# Patient Record
Sex: MALE | Race: WHITE | NOT HISPANIC OR LATINO | Employment: OTHER | URBAN - METROPOLITAN AREA
[De-identification: names, ages, dates, MRNs, and addresses within clinical notes are randomized per-mention and may not be internally consistent; named-entity substitution may affect disease eponyms.]

---

## 2017-01-11 RX ORDER — FLUTICASONE PROPIONATE 110 UG/1
2 AEROSOL, METERED RESPIRATORY (INHALATION) 2 TIMES DAILY
Qty: 36 G | Refills: 3 | Status: SHIPPED | OUTPATIENT
Start: 2017-01-11 | End: 2018-02-27 | Stop reason: SDUPTHER

## 2017-01-12 ENCOUNTER — PATIENT MESSAGE (OUTPATIENT)
Dept: PULMONOLOGY | Facility: CLINIC | Age: 72
End: 2017-01-12

## 2017-01-24 ENCOUNTER — PATIENT MESSAGE (OUTPATIENT)
Dept: PULMONOLOGY | Facility: CLINIC | Age: 72
End: 2017-01-24

## 2017-02-08 ENCOUNTER — TELEPHONE (OUTPATIENT)
Dept: GASTROENTEROLOGY | Facility: CLINIC | Age: 72
End: 2017-02-08

## 2017-02-08 NOTE — TELEPHONE ENCOUNTER
Provider Canceled (dr hawthorne will not be in the office , appt cxl, pt can speak with anyone to rescheduled appt

## 2017-02-09 ENCOUNTER — TELEPHONE (OUTPATIENT)
Dept: GASTROENTEROLOGY | Facility: CLINIC | Age: 72
End: 2017-02-09

## 2017-02-09 NOTE — TELEPHONE ENCOUNTER
----- Message from Joselyn Hui MA sent at 2/8/2017  4:23 PM CST -----  Contact: [phone]   Jose Antonio:   Pt appt on 2/14 was cx'ed due to book out, I tried to assist with rescheduling but he wants to see a staff doctor on that same day because he is driving in from out of town and has another appt here that day. Are you able to assist him with getting another doctor to see him that same day? He does not want a mid-level provider. [phone]

## 2017-02-14 ENCOUNTER — OFFICE VISIT (OUTPATIENT)
Dept: PULMONOLOGY | Facility: CLINIC | Age: 72
End: 2017-02-14
Payer: MEDICARE

## 2017-02-14 VITALS
OXYGEN SATURATION: 98 % | HEIGHT: 68 IN | SYSTOLIC BLOOD PRESSURE: 114 MMHG | RESPIRATION RATE: 12 BRPM | HEART RATE: 82 BPM | WEIGHT: 170.19 LBS | BODY MASS INDEX: 25.79 KG/M2 | DIASTOLIC BLOOD PRESSURE: 68 MMHG

## 2017-02-14 DIAGNOSIS — J45.30 CHRONIC ASTHMA, MILD PERSISTENT, UNCOMPLICATED: ICD-10-CM

## 2017-02-14 PROBLEM — J45.909 CHRONIC ASTHMA: Status: ACTIVE | Noted: 2017-02-14

## 2017-02-14 PROCEDURE — 1157F ADVNC CARE PLAN IN RCRD: CPT | Mod: S$GLB,,, | Performed by: INTERNAL MEDICINE

## 2017-02-14 PROCEDURE — 1126F AMNT PAIN NOTED NONE PRSNT: CPT | Mod: S$GLB,,, | Performed by: INTERNAL MEDICINE

## 2017-02-14 PROCEDURE — 99999 PR PBB SHADOW E&M-EST. PATIENT-LVL III: CPT | Mod: PBBFAC,,, | Performed by: INTERNAL MEDICINE

## 2017-02-14 PROCEDURE — 1160F RVW MEDS BY RX/DR IN RCRD: CPT | Mod: S$GLB,,, | Performed by: INTERNAL MEDICINE

## 2017-02-14 PROCEDURE — 3074F SYST BP LT 130 MM HG: CPT | Mod: S$GLB,,, | Performed by: INTERNAL MEDICINE

## 2017-02-14 PROCEDURE — 1159F MED LIST DOCD IN RCRD: CPT | Mod: S$GLB,,, | Performed by: INTERNAL MEDICINE

## 2017-02-14 PROCEDURE — 99203 OFFICE O/P NEW LOW 30 MIN: CPT | Mod: S$GLB,,, | Performed by: INTERNAL MEDICINE

## 2017-02-14 PROCEDURE — 99499 UNLISTED E&M SERVICE: CPT | Mod: S$PBB,,, | Performed by: INTERNAL MEDICINE

## 2017-02-14 PROCEDURE — 3078F DIAST BP <80 MM HG: CPT | Mod: S$GLB,,, | Performed by: INTERNAL MEDICINE

## 2017-02-15 NOTE — PROGRESS NOTES
Subjective:       Patient ID: Raul Lujan is a 71 y.o. male.    Chief Complaint: Asthma    HPI HISTORY OF PRESENT ILLNESS:  Mr. Lujan is a 71-year-old nonsmoker, who comes to   discuss medications for management of chronic asthma.  He had a previous   evaluation here in 2011.  At that time, his methacholine challenge showed   evidence for increased airway reactivity.  He had been having exertional   respiratory problems; and following the institution of treatment with Asmanex,     these symptoms improved substantially or resolved.  Over the   past several years, he has done well with this maintenance medication.  He is   generally able to exercise without respiratory distress.  He is not having any   nighttime respiratory symptoms.  He has not felt the need to use albuterol in   recent months or longer.    Mr. Lujan was recently notified that his pharmacy coverage no longer includes   Asmanex.  As an alternative, I prescribed Flovent 110 HFA inhaler.  He has not   yet begun use of this medication since his existing supply of Asmanex has not    run out.    Mr. Lujan used Zyrtec in the past for control of symptoms related to chronic   rhinitis.  This has not been a recent problem.  He is also not having any ongoing   upper GI symptoms.      CB/HN  dd: 02/14/2017 20:13:21 (CST)  td: 02/15/2017 09:13:26 (CST)  Doc ID   #[id number]  Job ID #[id number]    CC:       Review of Systems   Constitutional: Negative for fever and fatigue.   HENT: Negative for postnasal drip, sinus pressure, voice change and congestion.    Respiratory: Positive for chest tightness and dyspnea on extertion. Negative for cough, sputum production, shortness of breath and wheezing.    Cardiovascular: Negative for chest pain and leg swelling.   Genitourinary: Negative for difficulty urinating.   Musculoskeletal: Negative for arthralgias and back pain.   Skin: Negative for rash.   Gastrointestinal: Negative for abdominal pain and acid reflux.    Neurological: Negative for dizziness and weakness.   Hematological: Negative for adenopathy.       Objective:      Physical Exam   Constitutional: He is oriented to person, place, and time. He appears well-developed and well-nourished.   HENT:   Head: Normocephalic.   Mouth/Throat: Oropharynx is clear and moist. No oropharyngeal exudate.   Neck: Normal range of motion. Neck supple. No JVD present. No tracheal deviation present. No thyromegaly present.   Cardiovascular: Normal rate, regular rhythm and normal heart sounds.    No murmur heard.  Pulmonary/Chest: Normal expansion. No stridor. He has no wheezes. He has no rhonchi. He has no rales. He exhibits no tenderness.   Abdominal: Soft.   Musculoskeletal: He exhibits no edema.   Lymphadenopathy:     He has no cervical adenopathy.   Neurological: He is alert and oriented to person, place, and time.   Skin: Skin is warm and dry. No rash noted. No erythema. Nails show no clubbing.   Psychiatric: He has a normal mood and affect.   Vitals reviewed.    Personal Diagnostic Review    Pulmonary Function Tests 2/14/2017   SpO2 98   Height 68.000   Weight 2723.12   BMI (Calculated) 25.9         Assessment:       1. Chronic asthma, mild persistent, uncomplicated        Outpatient Encounter Prescriptions as of 2/14/2017   Medication Sig Dispense Refill    cetirizine (ZYRTEC) 10 MG tablet Take 10 mg by mouth once daily.      finasteride (PROSCAR) 5 mg tablet TAKE 1 TABLET ONE TIME DAILY 90 tablet 3    fluticasone (FLOVENT HFA) 110 mcg/actuation inhaler Inhale 2 puffs into the lungs 2 (two) times daily. Rinse mouth after each use. 36 g 3    lisinopril-hydrochlorothiazide (PRINZIDE,ZESTORETIC) 10-12.5 mg per tablet Take 1 tablet by mouth 2 (two) times daily. 180 tablet 4    naproxen (NAPROSYN) 500 MG tablet Take 1 tablet (500 mg total) by mouth 2 (two) times daily with meals. 180 tablet 2    pravastatin (PRAVACHOL) 40 MG tablet Take 1 tablet (40 mg total) by mouth once  daily. 1 Tablet Oral Every evening 90 tablet 4    tadalafil (CIALIS) 5 MG tablet TAKE ONE (1) TABLET BY MOUTH DAILY. 90 tablet 3     No facility-administered encounter medications on file as of 2/14/2017.      No orders of the defined types were placed in this encounter.    Plan:     Discussed role for an inhaled corticosteroid as a maintenance therapy for asthma control.  He will try Flovent  1-2 puffs twice daily (Flovent Diskus if he does not like the MDI device).  Return visit 1 year with Spirometry.    NOTE:  36 min visit with majority time counseling regarding asthma management.

## 2017-02-15 NOTE — PATIENT INSTRUCTIONS
Discussed role for an inhaled corticosteroid as a maintenance therapy for asthma control.  He will try Flovent  1-2 puffs twice daily (Flovent Diskus if he does not like the MDI device).  Return visit 1 year with Spirometry.

## 2017-03-15 ENCOUNTER — PATIENT MESSAGE (OUTPATIENT)
Dept: GASTROENTEROLOGY | Facility: CLINIC | Age: 72
End: 2017-03-15

## 2017-03-29 ENCOUNTER — TELEPHONE (OUTPATIENT)
Dept: INTERNAL MEDICINE | Facility: CLINIC | Age: 72
End: 2017-03-29

## 2017-03-29 ENCOUNTER — OFFICE VISIT (OUTPATIENT)
Dept: GASTROENTEROLOGY | Facility: CLINIC | Age: 72
End: 2017-03-29
Payer: MEDICARE

## 2017-03-29 VITALS
SYSTOLIC BLOOD PRESSURE: 135 MMHG | HEART RATE: 65 BPM | DIASTOLIC BLOOD PRESSURE: 86 MMHG | HEIGHT: 69 IN | WEIGHT: 168.44 LBS | BODY MASS INDEX: 24.95 KG/M2

## 2017-03-29 DIAGNOSIS — R10.9 LEFT SIDED ABDOMINAL PAIN: Primary | ICD-10-CM

## 2017-03-29 DIAGNOSIS — K63.5 HYPERPLASTIC POLYP OF ASCENDING COLON: ICD-10-CM

## 2017-03-29 PROCEDURE — 1159F MED LIST DOCD IN RCRD: CPT | Mod: S$GLB,,, | Performed by: INTERNAL MEDICINE

## 2017-03-29 PROCEDURE — 1125F AMNT PAIN NOTED PAIN PRSNT: CPT | Mod: S$GLB,,, | Performed by: INTERNAL MEDICINE

## 2017-03-29 PROCEDURE — 3075F SYST BP GE 130 - 139MM HG: CPT | Mod: S$GLB,,, | Performed by: INTERNAL MEDICINE

## 2017-03-29 PROCEDURE — 99999 PR PBB SHADOW E&M-EST. PATIENT-LVL III: CPT | Mod: PBBFAC,,, | Performed by: INTERNAL MEDICINE

## 2017-03-29 PROCEDURE — 3079F DIAST BP 80-89 MM HG: CPT | Mod: S$GLB,,, | Performed by: INTERNAL MEDICINE

## 2017-03-29 PROCEDURE — 99499 UNLISTED E&M SERVICE: CPT | Mod: S$GLB,,, | Performed by: INTERNAL MEDICINE

## 2017-03-29 PROCEDURE — 1157F ADVNC CARE PLAN IN RCRD: CPT | Mod: S$GLB,,, | Performed by: INTERNAL MEDICINE

## 2017-03-29 PROCEDURE — 99204 OFFICE O/P NEW MOD 45 MIN: CPT | Mod: S$GLB,,, | Performed by: INTERNAL MEDICINE

## 2017-03-29 PROCEDURE — 1160F RVW MEDS BY RX/DR IN RCRD: CPT | Mod: S$GLB,,, | Performed by: INTERNAL MEDICINE

## 2017-03-29 NOTE — MR AVS SNAPSHOT
[facility]  [address]  [place] [address]  Phone: [phone]  Fax: [phone]                  Raul Lujan   3/29/2017 11:00 AM   Office Visit    Description:  Male : 1945   Provider:  Kadeem Melton MD   Department:  [facility]           Reason for Visit     Abdominal Pain                To Do List           Goals (5 Years of Data)     None      [facility]     [facility] On Call Nurse Care Line -  Assistance  Registered nurses in the [facility] On Call Center provide clinical advisement, health education, appointment booking, and other advisory services.  Call for this free service at [phone].             Medications           Message regarding Medications     Verify the changes and/or additions to your medication regime listed below are the same as discussed with your clinician today.  If any of these changes or additions are incorrect, please notify your healthcare provider.             Verify that the below list of medications is an accurate representation of the medications you are currently taking.  If none reported, the list may be blank. If incorrect, please contact your healthcare provider. Carry this list with you in case of emergency.           Current Medications     cetirizine (ZYRTEC) 10 MG tablet Take 10 mg by mouth once daily.    finasteride (PROSCAR) 5 mg tablet TAKE 1 TABLET ONE TIME DAILY    fluticasone (FLOVENT HFA) 110 mcg/actuation inhaler Inhale 2 puffs into the lungs 2 (two) times daily. Rinse mouth after each use.    lisinopril-hydrochlorothiazide (PRINZIDE,ZESTORETIC) 10-12.5 mg per tablet Take 1 tablet by mouth 2 (two) times daily.    naproxen (NAPROSYN) 500 MG tablet Take 1 tablet (500 mg total) by mouth 2 (two) times daily with meals.    pravastatin (PRAVACHOL) 40 MG tablet Take 1 tablet (40 mg total) by mouth once daily. 1 Tablet Oral Every evening    tadalafil (CIALIS) 5 MG tablet TAKE ONE (1) TABLET BY  "MOUTH DAILY.           Clinical Reference Information           Your Vitals Were     BP Pulse Height Weight BMI    135/86 65 5' 9" (1.753 m) 76.4 kg (168 lb 6.9 oz) 24.87 kg/m2      Blood Pressure          Most Recent Value    BP  135/86      Allergies as of 3/29/2017     No Known Allergies      Immunizations Administered on Date of Encounter - 3/29/2017     None      Language Assistance Services     ATTENTION: Language assistance services are available, free of charge. Please call [phone].      ATENCIÓN: Si habla clive, tiene a shultz disposición servicios gratuitos de asistencia lingüística. Llame al [phone].     RAHDA Ý: N?u b?n nói Ti?ng Vi?t, có các d?ch v? h? tr? ngôn ng? mi?n phí dành cho b?n. G?i s? [phone].         [facility] complies with applicable Federal civil rights laws and does not discriminate on the basis of race, color, national origin, age, disability, or sex.        "

## 2017-03-29 NOTE — TELEPHONE ENCOUNTER
----- Message from Klaudia Welch sent at 3/29/2017  1:20 PM CDT -----  Contact: self [phone]  Patient is going back to [place] please call them back find out if dr Herman had scheduled for today, Please call him back and advise

## 2017-03-29 NOTE — LETTER
April 4, 2017      Chas Herman MD  [address]  [facility] [address]           [facility]  [address]  [facility] [address]  Phone: [phone]  Fax: [phone]          Patient: Raul Lujan   MR Number: [id number]   YOB: 1945   Date of Visit: 3/29/2017       Dear Dr. Chas Herman:    Thank you for referring Raul Lujan to me for evaluation. Attached you will find relevant portions of my assessment and plan of care.    If you have questions, please do not hesitate to call me. I look forward to following Raul Lujan along with you.    Sincerely,    Kadeem Melton MD    Enclosure  CC:  No Recipients    If you would like to receive this communication electronically, please contact [email or web address] or [phone] to request more information on [employer] access.    For providers and/or their staff who would like to refer a patient to [facility], please contact us through our one-stop-shop provider referral line, [facility], at [phone].    If you feel you have received this communication in error or would no longer like to receive these types of communications, please e-mail [email or web address]

## 2017-04-03 ENCOUNTER — PATIENT MESSAGE (OUTPATIENT)
Dept: INTERNAL MEDICINE | Facility: CLINIC | Age: 72
End: 2017-04-03

## 2017-04-03 DIAGNOSIS — G89.4 CHRONIC PAIN SYNDROME: Primary | ICD-10-CM

## 2017-04-05 NOTE — PROGRESS NOTES
[facility] Consultation Note    Reason for Consult:    Chief Complaint   Patient presents with    Abdominal Pain       PCP:   Chas Herman   [address] / [place] [address]    Referring MD:  Chas Herman Md  [address] [place] [address]    HPI:  Raul Lujan is a 71 y.o. male here for evaluation of abdominal pain.  Pain in LLQ and is constant for last 2 years.  Radiates from the groin up the left side.  Severity of 3/10.  Progressively worse.  Worse with physical activity and positions.  Stretching (hip extension) helps and leaning forward makes it worse.  Core strengthening exercise also make it worse on left, but not the right.  He has occasional heartburn if he eats late, which usually happens a couple of times per month.  Denies dysphagia.  Denies nausea, vomiting, abdominal cramping, or bloating.  He has normal bowel movements, usually twice daily, and no changes over the last few years.  Denies blood in the stool.  Pin not related to having bowel movements.  Seen by a spine specialist and had MRI done and the pain is not felt to be from the spine.      Endoscopic History:  COLONOSCOPY - 5/13/16 by Dr. Clif Martell; to cecum, no prep comment; 8x14 mm hyperplastic polyp in ascending colon removed with jumbo forceps; sigmoid diverticulosis; internal hemorrhoids; follow-up 5 years.      ROS:  Constitutional: No fevers, chills, No weight loss, normal appetite  ENT: No congestion, rhinorrhea, or chronic sinus problems  CV: No chest pain or palpitations  Pulm: No cough, No shortness of breath  Ophtho: No vision changes or pain  GI: see HPI  Derm: No rash or lesions  Heme: No lymphadenopathy, No bruising  MSK: No arthritis or joint swelling  : No dysuria, No frequent urination  Endo: No hot or cold intolerance      Medical History:  has a past medical history of Asthma, currently active; BPH with urinary obstruction; GERD (gastroesophageal reflux disease);  "Hyperlipidemia; and Hypertension.    Surgical History:  has a past surgical history that includes Appendectomy; Excisional hemorrhoidectomy (october 2012); and Hernia repair.    Family History: family history includes Glaucoma in his father. There is no history of Cancer or Asthma..     Social History:  reports that he has never smoked. He has never used smokeless tobacco. He reports that he does not drink alcohol.    Review of patient's allergies indicates:  No Known Allergies    Prior to Admission medications    Medication Sig Start Date End Date Taking? Authorizing Provider   cetirizine (ZYRTEC) 10 MG tablet Take 10 mg by mouth once daily.   Yes Historical Provider, MD   finasteride (PROSCAR) 5 mg tablet TAKE 1 TABLET ONE TIME DAILY 10/26/16  Yes Chas Herman MD   fluticasone (FLOVENT HFA) 110 mcg/actuation inhaler Inhale 2 puffs into the lungs 2 (two) times daily. Rinse mouth after each use. 1/11/17 1/11/18 Yes RICK Zurita MD   lisinopril-hydrochlorothiazide (PRINZIDE,ZESTORETIC) 10-12.5 mg per tablet Take 1 tablet by mouth 2 (two) times daily. 10/26/16 10/26/17 Yes Chas Herman MD   naproxen (NAPROSYN) 500 MG tablet Take 1 tablet (500 mg total) by mouth 2 (two) times daily with meals. 5/19/16  Yes Khushi Spears PA-C   pravastatin (PRAVACHOL) 40 MG tablet Take 1 tablet (40 mg total) by mouth once daily. 1 Tablet Oral Every evening 10/26/16  Yes Chas Herman MD   tadalafil (CIALIS) 5 MG tablet TAKE ONE (1) TABLET BY MOUTH DAILY. 10/26/16  Yes Chas Herman MD       Objective Findings:  Vital Signs:  /86  Pulse 65  Ht 5' 9" (1.753 m)  Wt 76.4 kg (168 lb 6.9 oz)  BMI 24.87 kg/m2  Body mass index is 24.87 kg/(m^2).    Physical Exam:  General Appearance:  Well appearing in no acute distress, appears stated age  Head:  Normocephalic, atraumatic  Eyes:  No scleral icterus or pallor, EOMI  ENT:  Neck supple, moist mucosa; OP clear  Lungs:  CTA bilaterally in anterior and " posterior fields, no wheezes, no crackles; no dullness  Heart:  Regular rate and rhythm, S1, S2 normal, no murmurs heard  Abdomen:  Soft, non distended with positive bowel sounds in all four quadrants. No hepatosplenomegaly, ascites, or mass; pain along the left costal margin tracking along to the back; no rashes; carnett's equivocal  Extremities:  No clubbing, cyanosis, or edema        Labs:  Lab Results   Component Value Date    WBC 5.13 10/26/2016    HGB 15.1 10/26/2016    HCT 44.4 10/26/2016    MCV 89 10/26/2016     10/26/2016     Lab Results   Component Value Date     10/26/2016    K 4.0 10/26/2016     10/26/2016    CO2 29 10/26/2016     10/26/2016    BUN 13 10/26/2016    CREATININE 0.9 10/26/2016    CALCIUM 9.6 10/26/2016    PROT 6.5 10/26/2016    ALBUMIN 4.0 10/26/2016    BILITOT 0.7 10/26/2016    ALKPHOS 43 (L) 10/26/2016    AST 24 10/26/2016    ALT 24 10/26/2016         Imaging:  CT abdomen/pelvis 3/9/15 normal        Assessment:  Raul Lujan is a 71 y.o. male with LLQ abdominal pain that radiates up the left side and also has tenderness of the LUQ costal margin.  CT scan unremarkable.  Colonoscopy by outside physician unremarkable.  Not felt to be due to a spinal source.  His description and physical examination point more towards a neuropathic or musculoskeletal origin.  No GI complaints.  Overall, the pain does not appear to be GI related.  Colonoscopy in 2016 with large hyperplastic ascending colon polyp with 5-yr recommended follow-up.       Recommendations/Plan:  No further GI evaluation necessary as would likely be very low yield.  Recommend further evaluation for a non-GI related source, possibly neuropathic or musculoskeletal.  Will defer further eval to his PCP.    Follow-up as needed in clinic.        Thank you so much for allowing me to participate in the care of Raul Lujan    Kadeem Melton MD

## 2017-04-11 ENCOUNTER — PATIENT MESSAGE (OUTPATIENT)
Dept: INTERNAL MEDICINE | Facility: CLINIC | Age: 72
End: 2017-04-11

## 2017-05-17 ENCOUNTER — PATIENT MESSAGE (OUTPATIENT)
Dept: INTERNAL MEDICINE | Facility: CLINIC | Age: 72
End: 2017-05-17

## 2017-07-23 ENCOUNTER — PATIENT MESSAGE (OUTPATIENT)
Dept: INTERNAL MEDICINE | Facility: CLINIC | Age: 72
End: 2017-07-23

## 2017-07-26 ENCOUNTER — OFFICE VISIT (OUTPATIENT)
Dept: INTERNAL MEDICINE | Facility: CLINIC | Age: 72
End: 2017-07-26
Payer: MEDICARE

## 2017-07-26 VITALS
BODY MASS INDEX: 24.29 KG/M2 | WEIGHT: 164 LBS | SYSTOLIC BLOOD PRESSURE: 124 MMHG | DIASTOLIC BLOOD PRESSURE: 82 MMHG | HEART RATE: 70 BPM | OXYGEN SATURATION: 98 % | HEIGHT: 69 IN

## 2017-07-26 DIAGNOSIS — M70.41 PREPATELLAR BURSITIS OF RIGHT KNEE: ICD-10-CM

## 2017-07-26 DIAGNOSIS — M47.819 SPONDYLOSIS WITHOUT MYELOPATHY: ICD-10-CM

## 2017-07-26 DIAGNOSIS — M25.561 ACUTE PAIN OF RIGHT KNEE: Primary | ICD-10-CM

## 2017-07-26 PROCEDURE — 99213 OFFICE O/P EST LOW 20 MIN: CPT | Mod: S$GLB,,, | Performed by: INTERNAL MEDICINE

## 2017-07-26 PROCEDURE — 99999 PR PBB SHADOW E&M-EST. PATIENT-LVL IV: CPT | Mod: PBBFAC,,, | Performed by: INTERNAL MEDICINE

## 2017-07-26 PROCEDURE — 1159F MED LIST DOCD IN RCRD: CPT | Mod: S$GLB,,, | Performed by: INTERNAL MEDICINE

## 2017-07-26 PROCEDURE — 1125F AMNT PAIN NOTED PAIN PRSNT: CPT | Mod: S$GLB,,, | Performed by: INTERNAL MEDICINE

## 2017-07-26 NOTE — PROGRESS NOTES
Subjective:       Patient ID: Raul Lujan is a 72 y.o. male.    Chief Complaint: Knee Pain (right )    History of present illness: Patient complains of right knee swelling after working on his knees on his deck for a prolonged period of time.  He tried ice which helped and the swelling is a little less.  There is not much pain.  He still has more work to do and does not intend to get off his knees yet.  He is using a pad but did not use any anti-inflammatory or an Ace wrap.  No skin breakdown or drainage.  The swelling did improve a bit but is still there.      Knee Pain        Review of Systems   Constitutional: Negative for activity change and unexpected weight change.   HENT: Negative for hearing loss, rhinorrhea and trouble swallowing.    Eyes: Negative for discharge and visual disturbance.   Respiratory: Negative for chest tightness and wheezing.    Cardiovascular: Negative for chest pain and palpitations.   Gastrointestinal: Negative for blood in stool, constipation, diarrhea and vomiting.   Endocrine: Negative for polydipsia and polyuria.   Genitourinary: Negative for difficulty urinating, hematuria and urgency.   Musculoskeletal: Positive for arthralgias and joint swelling. Negative for neck pain.   Neurological: Negative for weakness and headaches.   Psychiatric/Behavioral: Negative for confusion and dysphoric mood.       Objective:      Physical Exam   Constitutional: He appears well-developed and well-nourished.   HENT:   Head: Normocephalic and atraumatic.   Musculoskeletal: He exhibits tenderness (mild tenderness with flexion and extension of the right knee.) and deformity (mild fluid collection at the prepatellar bursa). He exhibits no edema.   Skin: No rash noted.   No skin breakdown       Assessment:       1. Acute pain of right knee    2. Prepatellar bursitis of right knee    3. Spondylosis without myelopathy        Plan:       Raul was seen today for knee pain.    Diagnoses and all orders for  this visit:    Acute pain of right knee  -     Ambulatory referral to Orthopedics    Prepatellar bursitis of right knee  Comments:  Ice, ace wrap, avoid kneeling. Consider Ortho.   Orders:  -     Ambulatory referral to Orthopedics    Spondylosis without myelopathy        Call with any problems

## 2017-08-13 ENCOUNTER — PATIENT MESSAGE (OUTPATIENT)
Dept: INTERNAL MEDICINE | Facility: CLINIC | Age: 72
End: 2017-08-13

## 2017-10-06 ENCOUNTER — PATIENT MESSAGE (OUTPATIENT)
Dept: PULMONOLOGY | Facility: CLINIC | Age: 72
End: 2017-10-06

## 2017-10-06 ENCOUNTER — PATIENT MESSAGE (OUTPATIENT)
Dept: INTERNAL MEDICINE | Facility: CLINIC | Age: 72
End: 2017-10-06

## 2017-10-06 ENCOUNTER — TELEPHONE (OUTPATIENT)
Dept: INTERNAL MEDICINE | Facility: CLINIC | Age: 72
End: 2017-10-06

## 2017-10-06 DIAGNOSIS — R21 SKIN RASH: ICD-10-CM

## 2017-10-06 DIAGNOSIS — R10.9 ABDOMINAL PAIN, UNSPECIFIED ABDOMINAL LOCATION: Primary | ICD-10-CM

## 2017-10-09 ENCOUNTER — PATIENT MESSAGE (OUTPATIENT)
Dept: INTERNAL MEDICINE | Facility: CLINIC | Age: 72
End: 2017-10-09

## 2017-10-09 NOTE — TELEPHONE ENCOUNTER
Libertad, however we can help him make these appointments. If he is still having abdominal pain then I think seeing Gastro again is important especially since he is worried about Diverticulitis. Maybe one of the checkout staff can help him with coordinating these.

## 2017-10-24 ENCOUNTER — OFFICE VISIT (OUTPATIENT)
Dept: INTERNAL MEDICINE | Facility: CLINIC | Age: 72
End: 2017-10-24
Payer: MEDICARE

## 2017-10-24 ENCOUNTER — LAB VISIT (OUTPATIENT)
Dept: LAB | Facility: HOSPITAL | Age: 72
End: 2017-10-24
Attending: INTERNAL MEDICINE
Payer: MEDICARE

## 2017-10-24 VITALS
HEART RATE: 67 BPM | WEIGHT: 167.56 LBS | SYSTOLIC BLOOD PRESSURE: 124 MMHG | BODY MASS INDEX: 24.74 KG/M2 | DIASTOLIC BLOOD PRESSURE: 80 MMHG | OXYGEN SATURATION: 97 %

## 2017-10-24 DIAGNOSIS — J45.909 CHRONIC ASTHMA WITHOUT COMPLICATION, UNSPECIFIED ASTHMA SEVERITY, UNSPECIFIED WHETHER PERSISTENT: Primary | ICD-10-CM

## 2017-10-24 DIAGNOSIS — R21 RASH OF BACK: ICD-10-CM

## 2017-10-24 DIAGNOSIS — R10.9 LEFT SIDED ABDOMINAL PAIN: ICD-10-CM

## 2017-10-24 DIAGNOSIS — E78.5 HYPERLIPIDEMIA, UNSPECIFIED HYPERLIPIDEMIA TYPE: ICD-10-CM

## 2017-10-24 DIAGNOSIS — S80.812A ABRASION OF LEFT LOWER EXTREMITY, INITIAL ENCOUNTER: ICD-10-CM

## 2017-10-24 DIAGNOSIS — Z12.5 SCREENING FOR PROSTATE CANCER: ICD-10-CM

## 2017-10-24 DIAGNOSIS — N13.8 BENIGN PROSTATIC HYPERPLASIA WITH URINARY OBSTRUCTION: ICD-10-CM

## 2017-10-24 DIAGNOSIS — I10 ESSENTIAL HYPERTENSION: ICD-10-CM

## 2017-10-24 DIAGNOSIS — N40.1 BENIGN PROSTATIC HYPERPLASIA WITH URINARY OBSTRUCTION: ICD-10-CM

## 2017-10-24 LAB
ALBUMIN SERPL BCP-MCNC: 3.8 G/DL
ALP SERPL-CCNC: 50 U/L
ALT SERPL W/O P-5'-P-CCNC: 23 U/L
ANION GAP SERPL CALC-SCNC: 7 MMOL/L
AST SERPL-CCNC: 24 U/L
BASOPHILS # BLD AUTO: 0.06 K/UL
BASOPHILS NFR BLD: 0.9 %
BILIRUB SERPL-MCNC: 0.5 MG/DL
BUN SERPL-MCNC: 18 MG/DL
CALCIUM SERPL-MCNC: 9.6 MG/DL
CHLORIDE SERPL-SCNC: 104 MMOL/L
CO2 SERPL-SCNC: 32 MMOL/L
COMPLEXED PSA SERPL-MCNC: 2.2 NG/ML
CREAT SERPL-MCNC: 0.9 MG/DL
CRP SERPL-MCNC: 1.3 MG/L
DIFFERENTIAL METHOD: NORMAL
EOSINOPHIL # BLD AUTO: 0.1 K/UL
EOSINOPHIL NFR BLD: 2 %
ERYTHROCYTE [DISTWIDTH] IN BLOOD BY AUTOMATED COUNT: 13.1 %
ERYTHROCYTE [SEDIMENTATION RATE] IN BLOOD BY WESTERGREN METHOD: 1 MM/HR
EST. GFR  (AFRICAN AMERICAN): >60 ML/MIN/1.73 M^2
EST. GFR  (NON AFRICAN AMERICAN): >60 ML/MIN/1.73 M^2
GLUCOSE SERPL-MCNC: 82 MG/DL
HCT VFR BLD AUTO: 43.2 %
HGB BLD-MCNC: 14.1 G/DL
LYMPHOCYTES # BLD AUTO: 2.1 K/UL
LYMPHOCYTES NFR BLD: 32.2 %
MCH RBC QN AUTO: 29.6 PG
MCHC RBC AUTO-ENTMCNC: 32.6 G/DL
MCV RBC AUTO: 91 FL
MONOCYTES # BLD AUTO: 0.5 K/UL
MONOCYTES NFR BLD: 7.4 %
NEUTROPHILS # BLD AUTO: 3.8 K/UL
NEUTROPHILS NFR BLD: 57.2 %
NRBC BLD-RTO: 0 /100 WBC
PLATELET # BLD AUTO: 198 K/UL
PMV BLD AUTO: 11.1 FL
POTASSIUM SERPL-SCNC: 3.9 MMOL/L
PROT SERPL-MCNC: 6.5 G/DL
RBC # BLD AUTO: 4.77 M/UL
SODIUM SERPL-SCNC: 143 MMOL/L
WBC # BLD AUTO: 6.62 K/UL

## 2017-10-24 PROCEDURE — 85025 COMPLETE CBC W/AUTO DIFF WBC: CPT

## 2017-10-24 PROCEDURE — 99499 UNLISTED E&M SERVICE: CPT | Mod: S$GLB,,, | Performed by: INTERNAL MEDICINE

## 2017-10-24 PROCEDURE — 36415 COLL VENOUS BLD VENIPUNCTURE: CPT

## 2017-10-24 PROCEDURE — 85651 RBC SED RATE NONAUTOMATED: CPT

## 2017-10-24 PROCEDURE — 84153 ASSAY OF PSA TOTAL: CPT

## 2017-10-24 PROCEDURE — 86140 C-REACTIVE PROTEIN: CPT

## 2017-10-24 PROCEDURE — 99999 PR PBB SHADOW E&M-EST. PATIENT-LVL IV: CPT | Mod: PBBFAC,,, | Performed by: INTERNAL MEDICINE

## 2017-10-24 PROCEDURE — 80053 COMPREHEN METABOLIC PANEL: CPT

## 2017-10-24 PROCEDURE — 99214 OFFICE O/P EST MOD 30 MIN: CPT | Mod: S$GLB,,, | Performed by: INTERNAL MEDICINE

## 2017-10-24 RX ORDER — SILDENAFIL CITRATE 20 MG/1
60 TABLET ORAL 3 TIMES DAILY
Qty: 270 TABLET | Refills: 4 | Status: SHIPPED | OUTPATIENT
Start: 2017-10-24 | End: 2018-06-29 | Stop reason: SDUPTHER

## 2017-10-24 NOTE — PROGRESS NOTES
Subjective:       Patient ID: Raul Lujan is a 72 y.o. male.    Chief Complaint: Abdominal Pain    History of present illness: 72-year-old male here to follow-up on issues.  He is only temporarily in town and informs me he's moving to [place] to be near his family.  He wanted to see pulmonary for follow-up of asthma.  He is continued with some chronic left-sided abdominal pain.  He brings in an outside scope that showed diverticulosis but no evidence of diverticulitis at that time.  The back and spine folks previously thought this may have been a radiculopathy type pain but he remains concerned about his abdomen.  He is seeing gastroenterology tomorrow.  He has a healing abrasion on the left shin.  It is much improved from previous.  He is also having slight rash on the back of his neck.  He thought it was from sunburn but there is some slight scaling in it may relate to a superficial fungal infection.  Lastly he would like me to present a generic sildenafil 20 mg prescription to take 3 daily.  He says it is to be used for BPH.  He certainly has a history of that although has had some ED as well.  I will provide the prescription.  I explained he still may require an authorization but we will try to assist.  He understands not to take this with nitroglycerin products.      Abdominal Pain   This is a chronic problem. The current episode started more than 1 year ago. The onset quality is gradual. The problem occurs constantly. The problem has been gradually worsening. The pain is located in the LLQ. The pain is at a severity of 3/10. The pain is mild. The quality of the pain is cramping. The abdominal pain does not radiate. Associated symptoms include myalgias. Pertinent negatives include no anorexia, arthralgias, belching, constipation, diarrhea, dysuria, fever, flatus, frequency, headaches, hematochezia, hematuria, melena, nausea, vomiting or weight loss. Nothing aggravates the pain. The pain is relieved by nothing.  He has tried nothing for the symptoms. Prior diagnostic workup includes CT scan, GI consult and lower endoscopy. His past medical history is significant for GERD. There is no history of abdominal surgery, colon cancer, Crohn's disease, gallstones, irritable bowel syndrome, pancreatitis, PUD or ulcerative colitis. Patient's medical history does not include kidney stones and UTI.     Review of Systems   Constitutional: Negative for chills, fatigue, fever, unexpected weight change and weight loss.   HENT: Negative for trouble swallowing.    Eyes: Negative for visual disturbance.   Respiratory: Negative for cough, shortness of breath and wheezing.    Cardiovascular: Negative for chest pain and palpitations.   Gastrointestinal: Positive for abdominal pain. Negative for anorexia, constipation, diarrhea, flatus, hematochezia, melena, nausea and vomiting.   Genitourinary: Negative for difficulty urinating, dysuria, frequency and hematuria.   Musculoskeletal: Positive for myalgias. Negative for arthralgias and neck pain.   Skin: Positive for rash and wound.   Neurological: Negative for dizziness and headaches.       Objective:      Physical Exam   Constitutional: He is oriented to person, place, and time. He appears well-developed and well-nourished. No distress.   HENT:   Head: Normocephalic and atraumatic.   Mouth/Throat: No oropharyngeal exudate.   TM's clear, pharynx clear   Eyes: Conjunctivae and EOM are normal. Pupils are equal, round, and reactive to light. No scleral icterus.   Neck: Normal range of motion. Neck supple. No thyromegaly present.   No supraclavicular nodes palpated   Cardiovascular: Normal rate, regular rhythm and normal heart sounds.    No murmur heard.  Pulmonary/Chest: Effort normal and breath sounds normal. He has no wheezes.   Abdominal: Soft. Bowel sounds are normal. He exhibits no mass. There is tenderness (left middle abdomen with no masses or hernias noted).   Musculoskeletal: He exhibits no  edema.   Lymphadenopathy:     He has no cervical adenopathy.   Neurological: He is alert and oriented to person, place, and time.   Skin: Rash noted. No pallor.   Small very well-healed abrasion left shin.  No erythema.  Very slight mildly hyperpigmented rash with some scaling or peeling on the back of the neck.  May be a mild fungal reaction   Psychiatric: He has a normal mood and affect.       Assessment:       1. Chronic asthma without complication, unspecified asthma severity, unspecified whether persistent    2. Essential hypertension    3. Hyperlipidemia, unspecified hyperlipidemia type    4. Rash of back    5. Abrasion of left lower extremity, initial encounter    6. Left sided abdominal pain    7. Screening for prostate cancer    8. Benign prostatic hyperplasia with urinary obstruction        Plan:       Raul was seen today for abdominal pain.    Diagnoses and all orders for this visit:    Chronic asthma without complication, unspecified asthma severity, unspecified whether persistent  -     Ambulatory referral to Pulmonology    Essential hypertension    Hyperlipidemia, unspecified hyperlipidemia type    Rash of back    Abrasion of left lower extremity, initial encounter    Left sided abdominal pain  -     CBC auto differential; Future  -     Comprehensive metabolic panel; Future  -     PSA, Screening; Future  -     Sedimentation rate, manual; Future  -     C-reactive protein; Future    Screening for prostate cancer  -     PSA, Screening; Future    Benign prostatic hyperplasia with urinary obstruction  -     sildenafil (REVATIO) 20 mg Tab; Take 3 tablets (60 mg total) by mouth 3 (three) times daily.        Follow-up after above

## 2017-10-25 ENCOUNTER — TELEPHONE (OUTPATIENT)
Dept: ENDOSCOPY | Facility: HOSPITAL | Age: 72
End: 2017-10-25

## 2017-10-25 ENCOUNTER — OFFICE VISIT (OUTPATIENT)
Dept: GASTROENTEROLOGY | Facility: CLINIC | Age: 72
End: 2017-10-25
Payer: MEDICARE

## 2017-10-25 ENCOUNTER — LAB VISIT (OUTPATIENT)
Dept: LAB | Facility: HOSPITAL | Age: 72
End: 2017-10-25
Payer: MEDICARE

## 2017-10-25 ENCOUNTER — PATIENT MESSAGE (OUTPATIENT)
Dept: INTERNAL MEDICINE | Facility: CLINIC | Age: 72
End: 2017-10-25

## 2017-10-25 VITALS
DIASTOLIC BLOOD PRESSURE: 92 MMHG | WEIGHT: 166.88 LBS | HEIGHT: 68 IN | HEART RATE: 65 BPM | SYSTOLIC BLOOD PRESSURE: 153 MMHG | BODY MASS INDEX: 25.29 KG/M2

## 2017-10-25 DIAGNOSIS — R10.32 CHRONIC LLQ PAIN: Primary | ICD-10-CM

## 2017-10-25 DIAGNOSIS — R10.32 CHRONIC LLQ PAIN: ICD-10-CM

## 2017-10-25 DIAGNOSIS — G89.29 CHRONIC LLQ PAIN: ICD-10-CM

## 2017-10-25 DIAGNOSIS — K21.9 GASTROESOPHAGEAL REFLUX DISEASE, ESOPHAGITIS PRESENCE NOT SPECIFIED: ICD-10-CM

## 2017-10-25 DIAGNOSIS — G89.29 CHRONIC LLQ PAIN: Primary | ICD-10-CM

## 2017-10-25 PROCEDURE — 86677 HELICOBACTER PYLORI ANTIBODY: CPT

## 2017-10-25 PROCEDURE — 99214 OFFICE O/P EST MOD 30 MIN: CPT | Mod: S$GLB,,, | Performed by: NURSE PRACTITIONER

## 2017-10-25 PROCEDURE — 99499 UNLISTED E&M SERVICE: CPT | Mod: S$GLB,,, | Performed by: NURSE PRACTITIONER

## 2017-10-25 PROCEDURE — 99999 PR PBB SHADOW E&M-EST. PATIENT-LVL V: CPT | Mod: PBBFAC,,, | Performed by: NURSE PRACTITIONER

## 2017-10-25 PROCEDURE — 36415 COLL VENOUS BLD VENIPUNCTURE: CPT

## 2017-10-25 NOTE — PROGRESS NOTES
[facility] Note    Reason for Visit:  The primary encounter diagnosis was Chronic LLQ pain. A diagnosis of Gastroesophageal reflux disease, esophagitis presence not specified was also pertinent to this visit.    PCP:   Chas Herman   [address] / [place] [address]    Referring MD:  Chas Herman Md  [address] [place] [address]    HPI:  This is a 72 y.o. male here for follow up evaluation of LLQ pain. He was last worked up with Dr. Melton in March for abdominal pain. He is new to me.     LLQ pain for a little over 2 years. Occurring daily and constantly present. Feels continuous ache. If presses in middle of abdomen will feel twinge to LLQ. Worse with core strengthening exercises. Severity was a 3/10 in March and now reports a 4/10. Pt reports not disabling pain capable of completed ADLs. Denies nausea, vomiting, and fever. Seen by a spine specialist and had MRI done and the pain is not felt to be from the spine. Abd CT 3/2015 No acute intra-abdominal findings to correlate with patient's clinical history of abdominal pain. Last visit in March appeared neuropathic or musculoskeletal.     Having 2 normal formed stool daily. No blood in stool and black tarry stool. 5/2016 Colonoscopy done in [place]- ascending colon polyp removed bx hyperplastic polyp. Repeat in 5 years.     Hx of Gerd. Pt reports EGD done 5-10 years ago and detected signs of reflux. Will lie down elevated at night. No PPI use. Will have frequent regurgitation. Denies dysphagia/odynophagia. NSAID usage- none.     ROS:  Constitutional: No fevers, no chills, No unintentional weight loss, no fatigue   ENT: No allergies  CV: No chest pain, no palpitations, no perif. edema  Pulm: No cough, No shortness of breath, no wheezes, no sputum  Ophtho: No vision changes  GI: see HPI; also no nausea, no vomiting, no change in appetite  Derm: No rash  Heme: No lymphadenopathy, No bruising  MSK: No arthritis, no  "muscle pain, no muscle weakness  : No dysuria, No hematuria  Endo: No hot or cold intolerance  Neuro: No syncope, No seizure     Medical History:  has a past medical history of Asthma, currently active; BPH with urinary obstruction; GERD (gastroesophageal reflux disease); Hyperlipidemia; and Hypertension.    Surgical History:  has a past surgical history that includes Appendectomy; Excisional hemorrhoidectomy (october 2012); and Hernia repair.    Family History: family history includes Glaucoma in his father..     Social History:  reports that he has never smoked. He has never used smokeless tobacco. He reports that he does not drink alcohol.    Review of patient's allergies indicates:  No Known Allergies    Current Outpatient Prescriptions   Medication Sig    cetirizine (ZYRTEC) 10 MG tablet Take 10 mg by mouth once daily.    finasteride (PROSCAR) 5 mg tablet TAKE 1 TABLET ONE TIME DAILY    fluticasone (FLOVENT HFA) 110 mcg/actuation inhaler Inhale 2 puffs into the lungs 2 (two) times daily. Rinse mouth after each use.    lisinopril-hydrochlorothiazide (PRINZIDE,ZESTORETIC) 10-12.5 mg per tablet Take 1 tablet by mouth 2 (two) times daily.    naproxen (NAPROSYN) 500 MG tablet Take 1 tablet (500 mg total) by mouth 2 (two) times daily with meals.    pravastatin (PRAVACHOL) 40 MG tablet Take 1 tablet (40 mg total) by mouth once daily. 1 Tablet Oral Every evening    sildenafil (REVATIO) 20 mg Tab Take 3 tablets (60 mg total) by mouth 3 (three) times daily.     No current facility-administered medications for this visit.      Objective Findings:    Vital Signs:  BP (!) 153/92   Pulse 65   Ht 5' 8" (1.727 m)   Wt 75.7 kg (166 lb 14.2 oz)   BMI 25.38 kg/m²   Body mass index is 25.38 kg/m².    Physical Exam:  General Appearance: Well appearing in no acute distress  Head: Normocephalic, without obvious abnormality  Eyes: No scleral icterus, EOMI  ENT: Neck supple, Lips, mucosa, and tongue normal; teeth and gums " normal  Lungs: CTA bilaterally in anterior and posterior fields, no wheezes, no crackles.  Heart: Regular rate and rhythm, no murmurs heard  Abdomen: Soft, tenderness present upon palpation to LLQ, no rebound tenderness, non distended with positive bowel sounds in all four quadrants.  Extremities: No clubbing, cyanosis or edema  Skin: No rash to exposed areas  Neurologic: AAOx4    Labs:  Lab Results   Component Value Date    WBC 6.62 10/24/2017    HGB 14.1 10/24/2017    HCT 43.2 10/24/2017     10/24/2017    CHOL 212 (H) 10/26/2016    TRIG 65 10/26/2016    HDL 68 10/26/2016    ALT 23 10/24/2017    AST 24 10/24/2017     10/24/2017    K 3.9 10/24/2017     10/24/2017    CREATININE 0.9 10/24/2017    BUN 18 10/24/2017    CO2 32 (H) 10/24/2017    TSH 1.353 09/05/2013    PSA 2.2 10/24/2017    HGBA1C 5.6 09/22/2014     Endoscopy:    Colonoscopy- 5/13/16 by Dr. Clif Martell; to cecum, no prep comment; 8x14 mm hyperplastic polyp in ascending colon removed with jumbo forceps; sigmoid diverticulosis; internal hemorrhoids; follow-up 5 years.    Assessment:  1. Chronic LLQ pain    2. Gastroesophageal reflux disease, esophagitis presence not specified      Recommendations:  1. Reviewed case with Dr. Brady. Will proceed with abdominal MRI to evaluate chronic abdominal pain. Possible musculoskeletal pain.   2. Schedule EGD to rule out esophagitis and ulcer. Order H. Pylori antibody.     Patient leaving for [place] and will be back in [facility] 11/16/17. Will have MRI done at that time and follow up pending MRI results. Pt understood.     Order summary:  Orders Placed This Encounter    MRI Abdomen W WO Contrast    H. PYLORI ANTIBODY, IGG     Thank you so much for allowing me to participate in the care of RaulANUEL Barba, FNP-C

## 2017-10-25 NOTE — LETTER
October 26, 2017      Chas Herman MD  [address]  [facility] [address]           [facility]  [address]  [facility] [address]  Phone: [phone]  Fax: [phone]          Patient: Raul Lujan   MR Number: [id number]   YOB: 1945   Date of Visit: 10/25/2017       Dear Dr. Chas Herman:    Thank you for referring Raul Lujan to me for evaluation. Attached you will find relevant portions of my assessment and plan of care.    If you have questions, please do not hesitate to call me. I look forward to following Raul Lujan along with you.    Sincerely,    Michelle Mora, ALLYSON    Enclosure  CC:  No Recipients    If you would like to receive this communication electronically, please contact [email or web address] or [phone] to request more information on [employer] Link access.    For providers and/or their staff who would like to refer a patient to [facility], please contact us through our one-stop-shop provider referral line, [facility], at [phone].    If you feel you have received this communication in error or would no longer like to receive these types of communications, please e-mail [email or web address]

## 2017-10-25 NOTE — TELEPHONE ENCOUNTER
Patient in office to schedule EGD.  Before attempting to schedule-he informed me that he doesn't want to schedule the procedure.  He told me that he has no one to be here with him and stay for procedure. He decided that he will have it done in [place].  He will follow up with Michelle after that time.  Order canceled.

## 2017-10-26 ENCOUNTER — TELEPHONE (OUTPATIENT)
Dept: PULMONOLOGY | Facility: CLINIC | Age: 72
End: 2017-10-26

## 2017-10-26 ENCOUNTER — PATIENT MESSAGE (OUTPATIENT)
Dept: INTERNAL MEDICINE | Facility: CLINIC | Age: 72
End: 2017-10-26

## 2017-10-26 ENCOUNTER — PATIENT MESSAGE (OUTPATIENT)
Dept: GASTROENTEROLOGY | Facility: CLINIC | Age: 72
End: 2017-10-26

## 2017-10-26 LAB — H PYLORI IGG SERPL QL IA: NEGATIVE

## 2017-10-26 NOTE — TELEPHONE ENCOUNTER
Pt reports recent hoarseness.  He is unsure whether this is related to GERD, or an adverse effect from Flovent.  He previously used Asmanex and did not have any throat symptoms.  He will begin trial with a spacer, and report results.

## 2017-10-30 ENCOUNTER — PATIENT MESSAGE (OUTPATIENT)
Dept: INTERNAL MEDICINE | Facility: CLINIC | Age: 72
End: 2017-10-30

## 2017-10-30 ENCOUNTER — PATIENT MESSAGE (OUTPATIENT)
Dept: GASTROENTEROLOGY | Facility: CLINIC | Age: 72
End: 2017-10-30

## 2017-11-15 ENCOUNTER — OFFICE VISIT (OUTPATIENT)
Dept: PULMONOLOGY | Facility: CLINIC | Age: 72
End: 2017-11-15
Payer: MEDICARE

## 2017-11-15 ENCOUNTER — OFFICE VISIT (OUTPATIENT)
Dept: INTERNAL MEDICINE | Facility: CLINIC | Age: 72
End: 2017-11-15
Payer: MEDICARE

## 2017-11-15 ENCOUNTER — HOSPITAL ENCOUNTER (OUTPATIENT)
Dept: PULMONOLOGY | Facility: CLINIC | Age: 72
Discharge: HOME | End: 2017-11-15
Payer: MEDICARE

## 2017-11-15 ENCOUNTER — HOSPITAL ENCOUNTER (OUTPATIENT)
Dept: RADIOLOGY | Facility: HOSPITAL | Age: 72
Discharge: HOME | End: 2017-11-15
Attending: NURSE PRACTITIONER
Payer: MEDICARE

## 2017-11-15 VITALS
BODY MASS INDEX: 25.58 KG/M2 | RESPIRATION RATE: 12 BRPM | DIASTOLIC BLOOD PRESSURE: 72 MMHG | HEIGHT: 67 IN | OXYGEN SATURATION: 97 % | SYSTOLIC BLOOD PRESSURE: 124 MMHG | HEART RATE: 77 BPM | WEIGHT: 163 LBS

## 2017-11-15 VITALS
SYSTOLIC BLOOD PRESSURE: 122 MMHG | BODY MASS INDEX: 25.79 KG/M2 | WEIGHT: 164.69 LBS | DIASTOLIC BLOOD PRESSURE: 72 MMHG | HEART RATE: 88 BPM | OXYGEN SATURATION: 97 %

## 2017-11-15 DIAGNOSIS — M54.5 CHRONIC LEFT-SIDED LOW BACK PAIN, WITH SCIATICA PRESENCE UNSPECIFIED: ICD-10-CM

## 2017-11-15 DIAGNOSIS — J45.909 CHRONIC ASTHMA: Primary | ICD-10-CM

## 2017-11-15 DIAGNOSIS — J45.30 CHRONIC ASTHMA, MILD PERSISTENT, UNCOMPLICATED: ICD-10-CM

## 2017-11-15 DIAGNOSIS — N13.8 BPH WITH URINARY OBSTRUCTION: ICD-10-CM

## 2017-11-15 DIAGNOSIS — J45.909 CHRONIC ASTHMA WITHOUT COMPLICATION, UNSPECIFIED ASTHMA SEVERITY, UNSPECIFIED WHETHER PERSISTENT: Primary | ICD-10-CM

## 2017-11-15 DIAGNOSIS — J31.0 CHRONIC RHINITIS, UNSPECIFIED TYPE: ICD-10-CM

## 2017-11-15 DIAGNOSIS — E78.5 HYPERLIPIDEMIA, UNSPECIFIED HYPERLIPIDEMIA TYPE: ICD-10-CM

## 2017-11-15 DIAGNOSIS — I10 ESSENTIAL HYPERTENSION: Primary | ICD-10-CM

## 2017-11-15 DIAGNOSIS — N40.1 BPH WITH URINARY OBSTRUCTION: ICD-10-CM

## 2017-11-15 DIAGNOSIS — R10.32 CHRONIC LLQ PAIN: ICD-10-CM

## 2017-11-15 DIAGNOSIS — K21.9 GASTROESOPHAGEAL REFLUX DISEASE, ESOPHAGITIS PRESENCE NOT SPECIFIED: ICD-10-CM

## 2017-11-15 DIAGNOSIS — G89.29 CHRONIC LLQ PAIN: ICD-10-CM

## 2017-11-15 DIAGNOSIS — I10 ESSENTIAL HYPERTENSION: ICD-10-CM

## 2017-11-15 DIAGNOSIS — G89.29 CHRONIC LEFT-SIDED LOW BACK PAIN, WITH SCIATICA PRESENCE UNSPECIFIED: ICD-10-CM

## 2017-11-15 DIAGNOSIS — J45.909 CHRONIC ASTHMA WITHOUT COMPLICATION, UNSPECIFIED ASTHMA SEVERITY, UNSPECIFIED WHETHER PERSISTENT: ICD-10-CM

## 2017-11-15 LAB
PRE FEV1 FVC: 78
PRE FEV1: 3.05
PRE FVC: 3.92
PREDICTED FEV1 FVC: 79
PREDICTED FEV1: 2.74
PREDICTED FVC: 3.45

## 2017-11-15 PROCEDURE — 74183 MRI ABD W/O CNTR FLWD CNTR: CPT | Mod: 26,,, | Performed by: RADIOLOGY

## 2017-11-15 PROCEDURE — 99213 OFFICE O/P EST LOW 20 MIN: CPT | Mod: S$GLB,,, | Performed by: INTERNAL MEDICINE

## 2017-11-15 PROCEDURE — 99999 PR PBB SHADOW E&M-EST. PATIENT-LVL III: CPT | Mod: PBBFAC,,, | Performed by: INTERNAL MEDICINE

## 2017-11-15 PROCEDURE — 74183 MRI ABD W/O CNTR FLWD CNTR: CPT | Mod: TC

## 2017-11-15 PROCEDURE — A9585 GADOBUTROL INJECTION: HCPCS | Performed by: NURSE PRACTITIONER

## 2017-11-15 PROCEDURE — 25500020 PHARM REV CODE 255: Performed by: NURSE PRACTITIONER

## 2017-11-15 PROCEDURE — 99214 OFFICE O/P EST MOD 30 MIN: CPT | Mod: 25,S$GLB,, | Performed by: INTERNAL MEDICINE

## 2017-11-15 PROCEDURE — 94010 BREATHING CAPACITY TEST: CPT | Mod: S$GLB,,, | Performed by: INTERNAL MEDICINE

## 2017-11-15 RX ORDER — GADOBUTROL 604.72 MG/ML
10 INJECTION INTRAVENOUS
Status: COMPLETED | OUTPATIENT
Start: 2017-11-15 | End: 2017-11-15

## 2017-11-15 RX ADMIN — GADOBUTROL 10 ML: 604.72 INJECTION INTRAVENOUS at 02:11

## 2017-11-15 NOTE — LETTER
November 15, 2017      Chas Herman MD  [address]  [facility] [address]           [facility]  [address]  [facility] [address]  Phone: [phone]          Patient: Raul Lujan   MR Number: [id number]   YOB: 1945   Date of Visit: 11/15/2017       Dear Dr. Chas Herman:    Thank you for referring Raul Lujan to me for evaluation. Attached you will find relevant portions of my assessment and plan of care.    If you have questions, please do not hesitate to call me. I look forward to following Raul Lujan along with you.    Sincerely,    RICK Zurita MD    Enclosure  CC:  No Recipients    If you would like to receive this communication electronically, please contact [email or web address] or [phone] to request more information on [employer] Link access.    For providers and/or their staff who would like to refer a patient to [facility], please contact us through our one-stop-shop provider referral line, [facility], at [phone].    If you feel you have received this communication in error or would no longer like to receive these types of communications, please e-mail [email or web address]

## 2017-11-15 NOTE — PROGRESS NOTES
Subjective:       Patient ID: Raul Lujan is a 72 y.o. male.    Chief Complaint: Follow-up    Patient here to follow up abdominal pain and back pain and asthma.  He is probably moving to [place] permanently but wanted to follow-up.  His mildly itchy skin is improved so we will not see a dermatologist down here.  He is finishing up with Gastroenterology and has seen Pulmonary.  He feels things are stable as [place].  He will monitor his blood pressure.      Review of Systems   Constitutional: Negative for chills, fatigue, fever and unexpected weight change.   HENT: Negative for nosebleeds and trouble swallowing.    Eyes: Negative for pain and visual disturbance.   Respiratory: Negative for cough, shortness of breath and wheezing.    Cardiovascular: Negative for chest pain and palpitations.   Gastrointestinal: Positive for abdominal pain (  Chronic mildLow lateral abdominal pain seeing gastro). Negative for constipation, diarrhea, nausea and vomiting.   Genitourinary: Negative for difficulty urinating and hematuria.   Musculoskeletal: Positive for back pain. Negative for neck pain.   Skin: Negative for rash.   Neurological: Negative for dizziness and headaches.   Hematological: Does not bruise/bleed easily.   Psychiatric/Behavioral: Negative for dysphoric mood, sleep disturbance and suicidal ideas.       Objective:      Physical Exam   Constitutional: He appears well-developed and well-nourished.   HENT:   Head: Normocephalic and atraumatic.   Cardiovascular: Regular rhythm.    Pulmonary/Chest: Effort normal and breath sounds normal.   Abdominal: There is tenderness.   Musculoskeletal: He exhibits tenderness.       Assessment:       1. Essential hypertension    2. Hyperlipidemia, unspecified hyperlipidemia type    3. Chronic asthma without complication, unspecified asthma severity, unspecified whether persistent    4. BPH with urinary obstruction    5. Chronic left-sided low back pain,  with sciatica presence unspecified    6. Gastroesophageal reflux disease, esophagitis presence not specified        Plan:       Raul was seen today for follow-up.    Diagnoses and all orders for this visit:    Essential hypertension    Hyperlipidemia, unspecified hyperlipidemia type    Chronic asthma without complication, unspecified asthma severity, unspecified whether persistent    BPH with urinary obstruction    Chronic left-sided low back pain, with sciatica presence unspecified    Gastroesophageal reflux disease, esophagitis presence not specified

## 2017-11-16 ENCOUNTER — TELEPHONE (OUTPATIENT)
Dept: GASTROENTEROLOGY | Facility: CLINIC | Age: 72
End: 2017-11-16

## 2017-11-16 NOTE — PATIENT INSTRUCTIONS
Begin trial with Flovent 110 HFA with spacer (he may try reduced dose after return to [place] permanently).  Complete GI evaluation, and restart Omeprazole if needed.  Call/return if needed.

## 2017-11-16 NOTE — PROGRESS NOTES
Subjective:       Patient ID: Raul Lujan is a 72 y.o. male.    Chief Complaint: Asthma    HPI HISTORY OF PRESENT ILLNESS:  Mr. Lujan is a 72-year-old nonsmoker, who returns   for an interval assessment of asthma.  He has not had any major airway problems   during the past several months.  He has had mild hoarseness or irritation in his   throat.  He had called several weeks ago to report this.  We had discussed the   plan for him to begin using Flovent inhaler with a spacer.  He has been   unable to begin this trial.  He actually resumed using Asmanex, which is the   maintenance medication he used prior to beginning Flovent, since he never   any throat symptoms while on that medication.    Mr. Lujan anticipates moving back to [place] in the next few months.  He   expects the respiratory symptoms will be less severe than they have been during   the time he has lived in [place].    Mr. Lujan remains on antihistamines for control of chronic rhinitis.  He also   continues to take medications for control of hypertension.    DATA:  I have reviewed the results of a spirometry study done earlier today.    The forced vital capacity is 3.92 L, which is 114% of the expected value.  The   FEV1 is 3.05 L, 112% of predicted.  The ratio of these values is 78%.  When today's study results are compared to a previous study from 11/2011, there   does not appear to have been any detrimental change.  Current values remain well   within the range of normal.      CB/HN  dd: 11/15/2017 20:10:18 (CST)  td: 11/16/2017 12:47:10 (CST)  Doc ID   #[id number]  Job ID #[id number]    CC:       Review of Systems   Constitutional: Negative for fever and fatigue.   HENT: Positive for voice change. Negative for postnasal drip, sinus pressure and congestion.    Respiratory: Positive for cough. Negative for sputum production, shortness of breath, wheezing and dyspnea on extertion.    Cardiovascular: Negative for chest pain and leg  swelling.   Genitourinary: Negative for difficulty urinating.   Musculoskeletal: Negative for arthralgias and back pain.   Skin: Negative for rash.   Gastrointestinal: Positive for acid reflux. Negative for abdominal pain.   Neurological: Negative for dizziness and weakness.   Hematological: Negative for adenopathy.       Objective:      Physical Exam   Constitutional: He is oriented to person, place, and time. He appears well-developed and well-nourished.   HENT:   Head: Normocephalic.   Mouth/Throat: Oropharynx is clear and moist. No oropharyngeal exudate.   Neck: Normal range of motion. Neck supple. No JVD present. No tracheal deviation present. No thyromegaly present.   Cardiovascular: Normal rate, regular rhythm and normal heart sounds.    No murmur heard.  Pulmonary/Chest: Normal expansion. No stridor. He has no wheezes. He has no rhonchi. He has no rales. He exhibits no tenderness.   Abdominal: Soft.   Musculoskeletal: He exhibits no edema.   Lymphadenopathy:     He has no cervical adenopathy.   Neurological: He is alert and oriented to person, place, and time.   Skin: Skin is warm and dry. No rash noted. No erythema. Nails show no clubbing.   Psychiatric: He has a normal mood and affect.   Vitals reviewed.    Personal Diagnostic Review    No flowsheet data found.      Assessment:       1. Chronic asthma without complication, unspecified asthma severity, unspecified whether persistent    2. Essential hypertension    3. Chronic rhinitis, unspecified type        Outpatient Encounter Prescriptions as of 11/15/2017   Medication Sig Dispense Refill    cetirizine (ZYRTEC) 10 MG tablet Take 10 mg by mouth once daily.      finasteride (PROSCAR) 5 mg tablet TAKE 1 TABLET ONE TIME DAILY 90 tablet 3    fluticasone (FLOVENT HFA) 110 mcg/actuation inhaler Inhale 2 puffs into the lungs 2 (two) times daily. Rinse mouth after each use. 36 g 3    lisinopril-hydrochlorothiazide (PRINZIDE,ZESTORETIC) 10-12.5 mg per tablet  Take 1 tablet by mouth 2 (two) times daily. 180 tablet 4    naproxen (NAPROSYN) 500 MG tablet Take 1 tablet (500 mg total) by mouth 2 (two) times daily with meals. 180 tablet 2    pravastatin (PRAVACHOL) 40 MG tablet Take 1 tablet (40 mg total) by mouth once daily. 1 Tablet Oral Every evening 90 tablet 4    sildenafil (REVATIO) 20 mg Tab Take 3 tablets (60 mg total) by mouth 3 (three) times daily. 270 tablet 4     Facility-Administered Encounter Medications as of 11/15/2017   Medication Dose Route Frequency Provider Last Rate Last Dose    [COMPLETED] gadobutrol 10 mL  10 mL Intravenous ONCE PRN Michelle Mora NP   10 mL at 11/15/17 1418     No orders of the defined types were placed in this encounter.    Plan:     Begin trial with Flovent 110 HFA with spacer (he may try reduced dose after return to [place] permanently).  Complete GI evaluation, and restart Omeprazole if needed.  Call/return if needed.

## 2017-11-16 NOTE — TELEPHONE ENCOUNTER
----- Message from Michelle Mora NP sent at 11/16/2017 12:08 PM CST -----  MRI abdomen unremarkable.     Michelle

## 2017-11-28 ENCOUNTER — PATIENT MESSAGE (OUTPATIENT)
Dept: INTERNAL MEDICINE | Facility: CLINIC | Age: 72
End: 2017-11-28

## 2018-01-10 ENCOUNTER — PATIENT MESSAGE (OUTPATIENT)
Dept: GASTROENTEROLOGY | Facility: CLINIC | Age: 73
End: 2018-01-10

## 2018-01-11 ENCOUNTER — PATIENT MESSAGE (OUTPATIENT)
Dept: GASTROENTEROLOGY | Facility: CLINIC | Age: 73
End: 2018-01-11

## 2018-01-14 ENCOUNTER — PATIENT MESSAGE (OUTPATIENT)
Dept: INTERNAL MEDICINE | Facility: CLINIC | Age: 73
End: 2018-01-14

## 2018-01-14 ENCOUNTER — PATIENT MESSAGE (OUTPATIENT)
Dept: GASTROENTEROLOGY | Facility: CLINIC | Age: 73
End: 2018-01-14

## 2018-01-16 ENCOUNTER — TELEPHONE (OUTPATIENT)
Dept: GASTROENTEROLOGY | Facility: CLINIC | Age: 73
End: 2018-01-16

## 2018-01-16 DIAGNOSIS — K21.9 GASTROESOPHAGEAL REFLUX DISEASE, ESOPHAGITIS PRESENCE NOT SPECIFIED: Primary | ICD-10-CM

## 2018-01-16 DIAGNOSIS — G89.29 CHRONIC LLQ PAIN: ICD-10-CM

## 2018-01-16 DIAGNOSIS — R10.32 CHRONIC LLQ PAIN: ICD-10-CM

## 2018-01-16 NOTE — TELEPHONE ENCOUNTER
----- Message from Michelle Mora NP sent at 1/16/2018  9:06 AM CST -----  Can you please notify Mr. Lujan ordered placed for EGD and can you give him schedulers number to schedule EGD. Thanks.     Michelle

## 2018-02-19 ENCOUNTER — TELEPHONE (OUTPATIENT)
Dept: INTERNAL MEDICINE | Facility: CLINIC | Age: 73
End: 2018-02-19

## 2018-02-19 NOTE — TELEPHONE ENCOUNTER
----- Message from Gabrielle Glass sent at 2/19/2018 10:50 AM CST -----  Contact: self [phone]  Patient requesting a call from the office in regards to appointment he  Have on 02/27/18. Please advise, Thanks

## 2018-02-19 NOTE — TELEPHONE ENCOUNTER
Pt has an annual exam scheduled for 2/27/18 at 10:30 and his wife has an appointment scheduled on the same day at 11:15. Can I move the wife to the 11 slot so they can be seen together?

## 2018-02-26 ENCOUNTER — TELEPHONE (OUTPATIENT)
Dept: OPTOMETRY | Facility: CLINIC | Age: 73
End: 2018-02-26

## 2018-02-26 NOTE — TELEPHONE ENCOUNTER
Member Details      Member Name: SANDRA WOODS  Member ID: [id number]  Social Security Number: [id number]  YOB: 1945  Address: [address] [place] [address] [address] [address]  Phone Number: [phone]  Gender: Male  Responsible Member: SANDRA WOODS    Network: [id number]  Group: [employer] Exam [employer] ([id number])  Benefit Level: 775  Service Eligibility  If you have more than one location under your User ID, choose a location then use the drop-down menu to choose the provider who is rendering services.     From the tabs below, select the type of benefit you will be providing, then check the box(es) next to the applicable service(s). You will not receive an authorization for this member. Instead, simply click Submit Claim to start the claim process.    Routine refers to routine vision benefits, including eye exams, lenses, frames and contact lenses.   Medical refers to benefits for medical eye care services, including diabetic eye care.   Additional Purchases will calculate member payments on additional pairs of glasses and other materials members receive discounts on so you can determine member out-of-pocket costs.  To learn more, download our Member Benefits Display Job Aid.          Location [address] [facility], [address] ([id number])  Provider   Date of Service: 02/27/2018  Routine   is Eligible? Member Eligible As Of*   Exam Yes 01/01/2017   Lenses Yes 01/01/2017   Frames Yes 01/01/2017   Contact Lenses Yes 01/01/2017  Submit Claim  You will not get an authorization for this member; simply click Submit Claim to start the claim process.         Where did my authorization button go? Click here    Where is the reimbursement button? Click here    Click here to learn more about migrated members.          This information is based on data available in our system today and does not take into account future changes to the member's plan. Please verify eligibility immediately prior  to receiving services.    Service Restrictions  Plan allows member to receive Exam only services with discount on materials. Frame, Lens, and lens options must be purchased in the same transaction to receive the full discount. If purchased separately, members receive 20% off retail price  Related Members  Below are other members covered under the same subscriber ID. Select the member name to start a claim.    Member Name Group Name Member ID NICHELLE# SANDRA DAY Medicare Exam Select Doctor [id number] [phone]  Showing 1 family member  Member Benefits  Select the link below to view benefit details for this member. (Your browser must be java script-enabled to use this function.)    Note: you will not receive an authorization for this member. To complete the claim later, select Claims from the navigation window.     Hide Benefits    Routine In Network  Exam Services   Exam with Dilation as Necessary $0 Copay  Frames   Frame 40% off Retail Price  Lenses   Single Vision $50  Bifocal $70  Trifocal $105  Lenticular Single Vision 20% off Retail Price  Progressive - Standard $135  Progressive - Premium 20% off Retail Price  Lens Options   Anti Reflective Coating - Standard $45  Anti Reflective Coating - Premium 20% off Retail Price  Polycarbonate - Standard $40  Scratch Coating - Standard Plastic $15  Tint - Solid or Gradient $15  UV Treatment $15  All Other Lens Options 20% off Retail Price  Contact Lenses   Contacts - Conventional 15% off Retail Price  Contacts - Disposable 100% of Retail Price  Benefits are not provided for services or materials arising from:    Orthoptic or vision training, subnormal vision aids and any associated supplemental testing  Aniseikonic lenses  Medical and/or surgical treatment of the eye, eyes or supporting structures  Any eye or vision examination, or any corrective eyewear required by a policyholder as a condition of employment  Safety eyewear  Services provided as a  result of any Workers' Compensation law, or similar legislation, or required by any governmental agency or program whether federal, state or subdivisions thereof  S[place]ney (non-prescription) lenses and/or contact lenses  Non-prescription sunglasses  Two pair of glasses in lieu of bifocals  Services or materials provided by any other group benefit plan providing vision care  Services rendered after the date an insured person ceases to be covered under the policy, except when vision materials ordered before coverage ended are delivered, and the services rendered to the Insured person are within 31 days from the date of such order  Replacement of lost or broken lenses, frames, glasses or contact lenses except in the next benefit frequency when vision materials would next become available  Discounts on certain brand name vision materials in which the  imposes a no-discount practice  Benefits may not be combined with any discount, promotional offering or other group benefit plans. Benefit allowances provide no remaining balance for future use within the same benefit frequency.    Patient Financial Responsibility  [facility] recommends you print this page and have the patient sign and date the below acknowledgement. The document can be kept in the patient's file.    I hereby authorize this vision care provider to apply for benefits on my behalf for covered services rendered by them. I also assign my benefits and request that all payments from [facility] made directly to the vision care provider. I agree to assume responsibility for full payment pending any remaining balance that is not covered by [employer].    I certify that the information I have reported with regard to my coverage is correct. I further authorize vision care provider to release to [facility] and its agents any information related to this or any related claim.

## 2018-02-27 ENCOUNTER — LAB VISIT (OUTPATIENT)
Dept: LAB | Facility: HOSPITAL | Age: 73
End: 2018-02-27
Attending: INTERNAL MEDICINE
Payer: MEDICARE

## 2018-02-27 ENCOUNTER — OFFICE VISIT (OUTPATIENT)
Dept: INTERNAL MEDICINE | Facility: CLINIC | Age: 73
End: 2018-02-27
Payer: MEDICARE

## 2018-02-27 ENCOUNTER — OFFICE VISIT (OUTPATIENT)
Dept: OPTOMETRY | Facility: CLINIC | Age: 73
End: 2018-02-27
Payer: COMMERCIAL

## 2018-02-27 VITALS
HEIGHT: 69 IN | OXYGEN SATURATION: 98 % | SYSTOLIC BLOOD PRESSURE: 118 MMHG | DIASTOLIC BLOOD PRESSURE: 76 MMHG | WEIGHT: 163.81 LBS | HEART RATE: 66 BPM | BODY MASS INDEX: 24.26 KG/M2

## 2018-02-27 DIAGNOSIS — H04.123 DRY EYE SYNDROME, BILATERAL: ICD-10-CM

## 2018-02-27 DIAGNOSIS — H52.4 PRESBYOPIA: Primary | ICD-10-CM

## 2018-02-27 DIAGNOSIS — M51.37 DEGENERATION OF LUMBAR OR LUMBOSACRAL INTERVERTEBRAL DISC: ICD-10-CM

## 2018-02-27 DIAGNOSIS — E78.5 HYPERLIPIDEMIA, UNSPECIFIED HYPERLIPIDEMIA TYPE: ICD-10-CM

## 2018-02-27 DIAGNOSIS — D31.32 CHOROIDAL NEVUS, LEFT EYE: ICD-10-CM

## 2018-02-27 DIAGNOSIS — J45.30 CHRONIC ASTHMA, MILD PERSISTENT, UNCOMPLICATED: ICD-10-CM

## 2018-02-27 DIAGNOSIS — H25.13 NS (NUCLEAR SCLEROSIS), BILATERAL: ICD-10-CM

## 2018-02-27 DIAGNOSIS — N13.8 BPH WITH URINARY OBSTRUCTION: ICD-10-CM

## 2018-02-27 DIAGNOSIS — K21.9 GASTROESOPHAGEAL REFLUX DISEASE, ESOPHAGITIS PRESENCE NOT SPECIFIED: ICD-10-CM

## 2018-02-27 DIAGNOSIS — M43.6 NECK STIFFNESS: ICD-10-CM

## 2018-02-27 DIAGNOSIS — N40.1 BPH WITH URINARY OBSTRUCTION: ICD-10-CM

## 2018-02-27 DIAGNOSIS — I10 ESSENTIAL HYPERTENSION: ICD-10-CM

## 2018-02-27 DIAGNOSIS — Z23 NEED FOR PROPHYLACTIC VACCINATION WITH STREPTOCOCCUS PNEUMONIAE (PNEUMOCOCCUS) AND INFLUENZA VACCINES: ICD-10-CM

## 2018-02-27 DIAGNOSIS — M47.819 SPONDYLOSIS WITHOUT MYELOPATHY: ICD-10-CM

## 2018-02-27 DIAGNOSIS — Z00.00 ROUTINE PHYSICAL EXAMINATION: Primary | ICD-10-CM

## 2018-02-27 LAB
CHOLEST SERPL-MCNC: 191 MG/DL
CHOLEST/HDLC SERPL: 2.9 {RATIO}
HDLC SERPL-MCNC: 65 MG/DL
HDLC SERPL: 34 %
LDLC SERPL CALC-MCNC: 116.6 MG/DL
NONHDLC SERPL-MCNC: 126 MG/DL
TRIGL SERPL-MCNC: 47 MG/DL

## 2018-02-27 PROCEDURE — 99999 PR PBB SHADOW E&M-EST. PATIENT-LVL III: CPT | Mod: PBBFAC,,, | Performed by: INTERNAL MEDICINE

## 2018-02-27 PROCEDURE — 36415 COLL VENOUS BLD VENIPUNCTURE: CPT

## 2018-02-27 PROCEDURE — 99999 PR PBB SHADOW E&M-EST. PATIENT-LVL III: CPT | Mod: PBBFAC,,, | Performed by: OPTOMETRIST

## 2018-02-27 PROCEDURE — 90732 PPSV23 VACC 2 YRS+ SUBQ/IM: CPT | Mod: S$GLB,,, | Performed by: INTERNAL MEDICINE

## 2018-02-27 PROCEDURE — 80061 LIPID PANEL: CPT

## 2018-02-27 PROCEDURE — 92014 COMPRE OPH EXAM EST PT 1/>: CPT | Mod: S$GLB,,, | Performed by: OPTOMETRIST

## 2018-02-27 PROCEDURE — 92250 FUNDUS PHOTOGRAPHY W/I&R: CPT | Mod: S$GLB,,, | Performed by: OPTOMETRIST

## 2018-02-27 PROCEDURE — G0009 ADMIN PNEUMOCOCCAL VACCINE: HCPCS | Mod: S$GLB,,, | Performed by: INTERNAL MEDICINE

## 2018-02-27 PROCEDURE — 99397 PER PM REEVAL EST PAT 65+ YR: CPT | Mod: S$GLB,,, | Performed by: INTERNAL MEDICINE

## 2018-02-27 RX ORDER — PRAVASTATIN SODIUM 40 MG/1
40 TABLET ORAL DAILY
Qty: 90 TABLET | Refills: 4 | Status: SHIPPED | OUTPATIENT
Start: 2018-02-27

## 2018-02-27 RX ORDER — FLUTICASONE PROPIONATE 110 UG/1
2 AEROSOL, METERED RESPIRATORY (INHALATION) 2 TIMES DAILY
Qty: 36 G | Refills: 3 | Status: SHIPPED | OUTPATIENT
Start: 2018-02-27 | End: 2019-02-27

## 2018-02-27 RX ORDER — FINASTERIDE 5 MG/1
TABLET, FILM COATED ORAL
Qty: 90 TABLET | Refills: 3 | Status: SHIPPED | OUTPATIENT
Start: 2018-02-27

## 2018-02-27 RX ORDER — LISINOPRIL AND HYDROCHLOROTHIAZIDE 10; 12.5 MG/1; MG/1
1 TABLET ORAL 2 TIMES DAILY
Qty: 180 TABLET | Refills: 4 | Status: SHIPPED | OUTPATIENT
Start: 2018-02-27 | End: 2019-02-27

## 2018-02-27 NOTE — PROGRESS NOTES
Subjective:       Patient ID: Raul Lujan is a 72 y.o. male.    Chief Complaint: Annual Exam    Here for Annual Exam. Has EGD and Eye exam in next 2 days. Continues to have chronic left abdominal pain. He needed a few Rx refills. Denies CP or SOB. Needs update Pneumovax.   He may be moving permanently to [place].       Review of Systems   Constitutional: Negative for activity change, chills, fatigue, fever and unexpected weight change.   HENT: Negative for hearing loss, nosebleeds, rhinorrhea and trouble swallowing.    Eyes: Negative for pain, discharge and visual disturbance.   Respiratory: Negative for cough, chest tightness, shortness of breath and wheezing.    Cardiovascular: Negative for chest pain and palpitations.   Gastrointestinal: Positive for abdominal pain (LLQ). Negative for blood in stool, constipation, diarrhea, nausea and vomiting.   Endocrine: Negative for polydipsia and polyuria.   Genitourinary: Negative for difficulty urinating, hematuria and urgency.   Musculoskeletal: Positive for arthralgias, back pain and neck pain. Negative for joint swelling.   Skin: Negative for rash.   Neurological: Negative for dizziness, weakness and headaches.   Hematological: Does not bruise/bleed easily.   Psychiatric/Behavioral: Negative for confusion, dysphoric mood, sleep disturbance and suicidal ideas.       Objective:      Physical Exam   Constitutional: He is oriented to person, place, and time. He appears well-developed and well-nourished. No distress.   HENT:   Head: Normocephalic and atraumatic.   Right Ear: External ear normal.   Left Ear: External ear normal.   Mouth/Throat: Oropharynx is clear and moist. No oropharyngeal exudate.   TM's clear, pharynx clear   Eyes: Conjunctivae and EOM are normal. Pupils are equal, round, and reactive to light. No scleral icterus.   Neck: Normal range of motion. Neck supple. No thyromegaly present.   No supraclavicular nodes palpated   Cardiovascular: Normal rate,  regular rhythm and normal heart sounds.    No murmur heard.  Pulmonary/Chest: Effort normal and breath sounds normal. He has no wheezes.   Abdominal: Soft. Bowel sounds are normal. He exhibits no mass. There is no tenderness.   Musculoskeletal: He exhibits no edema.   Lymphadenopathy:     He has no cervical adenopathy.   Neurological: He is alert and oriented to person, place, and time.   Skin: No pallor.   Psychiatric: He has a normal mood and affect.       Assessment:       1. Routine physical examination    2. Degeneration of lumbar or lumbosacral intervertebral disc    3. Chronic asthma, mild persistent, uncomplicated    4. Essential hypertension    5. Hyperlipidemia, unspecified hyperlipidemia type    6. Neck stiffness    7. Spondylosis without myelopathy    8. Gastroesophageal reflux disease, esophagitis presence not specified    9. BPH with urinary obstruction    10. Need for prophylactic vaccination with Streptococcus pneumoniae (Pneumococcus) and Influenza vaccines        Plan:       Raul was seen today for annual exam.    Diagnoses and all orders for this visit:    Routine physical examination    Degeneration of lumbar or lumbosacral intervertebral disc    Chronic asthma, mild persistent, uncomplicated    Essential hypertension  -     Lipid panel; Future    Hyperlipidemia, unspecified hyperlipidemia type  -     Lipid panel; Future    Neck stiffness    Spondylosis without myelopathy    Gastroesophageal reflux disease, esophagitis presence not specified  Comments:  Having an EGD tomorrow.     BPH with urinary obstruction  -     finasteride (PROSCAR) 5 mg tablet; TAKE 1 TABLET ONE TIME DAILY    Need for prophylactic vaccination with Streptococcus pneumoniae (Pneumococcus) and Influenza vaccines  -     Pneumococcal Polysaccharide Vaccine (23 Valent) (SQ/IM)    Other orders  -     lisinopril-hydrochlorothiazide (PRINZIDE,ZESTORETIC) 10-12.5 mg per tablet; Take 1 tablet by mouth 2 (two) times daily.  -      pravastatin (PRAVACHOL) 40 MG tablet; Take 1 tablet (40 mg total) by mouth once daily. 1 Tablet Oral Every evening  -     fluticasone (FLOVENT HFA) 110 mcg/actuation inhaler; Inhale 2 puffs into the lungs 2 (two) times daily. Rinse mouth after each use.        Review labs.

## 2018-02-28 ENCOUNTER — ANESTHESIA (OUTPATIENT)
Dept: ENDOSCOPY | Facility: HOSPITAL | Age: 73
End: 2018-02-28
Payer: MEDICARE

## 2018-02-28 ENCOUNTER — SURGERY (OUTPATIENT)
Age: 73
End: 2018-02-28

## 2018-02-28 ENCOUNTER — HOSPITAL ENCOUNTER (OUTPATIENT)
Facility: HOSPITAL | Age: 73
Discharge: HOME | End: 2018-02-28
Attending: INTERNAL MEDICINE | Admitting: INTERNAL MEDICINE
Payer: MEDICARE

## 2018-02-28 ENCOUNTER — ANESTHESIA EVENT (OUTPATIENT)
Dept: ENDOSCOPY | Facility: HOSPITAL | Age: 73
End: 2018-02-28
Payer: MEDICARE

## 2018-02-28 VITALS
HEIGHT: 69 IN | TEMPERATURE: 98 F | SYSTOLIC BLOOD PRESSURE: 120 MMHG | OXYGEN SATURATION: 97 % | RESPIRATION RATE: 14 BRPM | HEART RATE: 51 BPM | DIASTOLIC BLOOD PRESSURE: 77 MMHG | BODY MASS INDEX: 23.99 KG/M2 | WEIGHT: 162 LBS

## 2018-02-28 DIAGNOSIS — K21.9 GERD (GASTROESOPHAGEAL REFLUX DISEASE): ICD-10-CM

## 2018-02-28 DIAGNOSIS — K21.9 GASTROESOPHAGEAL REFLUX DISEASE, ESOPHAGITIS PRESENCE NOT SPECIFIED: Primary | ICD-10-CM

## 2018-02-28 PROCEDURE — 63600175 PHARM REV CODE 636 W HCPCS: Performed by: NURSE ANESTHETIST, CERTIFIED REGISTERED

## 2018-02-28 PROCEDURE — 27201012 HC FORCEPS, HOT/COLD, DISP: Performed by: INTERNAL MEDICINE

## 2018-02-28 PROCEDURE — 88305 TISSUE EXAM BY PATHOLOGIST: CPT | Performed by: PATHOLOGY

## 2018-02-28 PROCEDURE — D9220A PRA ANESTHESIA: Mod: ANES,,, | Performed by: ANESTHESIOLOGY

## 2018-02-28 PROCEDURE — 25000003 PHARM REV CODE 250: Performed by: INTERNAL MEDICINE

## 2018-02-28 PROCEDURE — 37000008 HC ANESTHESIA 1ST 15 MINUTES: Performed by: INTERNAL MEDICINE

## 2018-02-28 PROCEDURE — D9220A PRA ANESTHESIA: Mod: CRNA,,, | Performed by: NURSE ANESTHETIST, CERTIFIED REGISTERED

## 2018-02-28 PROCEDURE — 43239 EGD BIOPSY SINGLE/MULTIPLE: CPT | Performed by: INTERNAL MEDICINE

## 2018-02-28 PROCEDURE — 43239 EGD BIOPSY SINGLE/MULTIPLE: CPT | Mod: ,,, | Performed by: INTERNAL MEDICINE

## 2018-02-28 PROCEDURE — 37000009 HC ANESTHESIA EA ADD 15 MINS: Performed by: INTERNAL MEDICINE

## 2018-02-28 RX ORDER — LIDOCAINE HCL/PF 100 MG/5ML
SYRINGE (ML) INTRAVENOUS
Status: DISCONTINUED | OUTPATIENT
Start: 2018-02-28 | End: 2018-02-28

## 2018-02-28 RX ORDER — SODIUM CHLORIDE 9 MG/ML
INJECTION, SOLUTION INTRAVENOUS CONTINUOUS
Status: DISCONTINUED | OUTPATIENT
Start: 2018-02-28 | End: 2018-02-28 | Stop reason: HOSPADM

## 2018-02-28 RX ORDER — PROPOFOL 10 MG/ML
VIAL (ML) INTRAVENOUS
Status: DISCONTINUED | OUTPATIENT
Start: 2018-02-28 | End: 2018-02-28

## 2018-02-28 RX ORDER — VITAMIN A 3000 MCG
10000 CAPSULE ORAL DAILY
COMMUNITY

## 2018-02-28 RX ORDER — PROPOFOL 10 MG/ML
VIAL (ML) INTRAVENOUS CONTINUOUS PRN
Status: DISCONTINUED | OUTPATIENT
Start: 2018-02-28 | End: 2018-02-28

## 2018-02-28 RX ADMIN — SODIUM CHLORIDE: 9 INJECTION, SOLUTION INTRAVENOUS at 08:02

## 2018-02-28 RX ADMIN — PROPOFOL 150 MCG/KG/MIN: 10 INJECTION, EMULSION INTRAVENOUS at 08:02

## 2018-02-28 RX ADMIN — PROPOFOL 60 MG: 10 INJECTION, EMULSION INTRAVENOUS at 08:02

## 2018-02-28 RX ADMIN — LIDOCAINE HYDROCHLORIDE 60 MG: 20 INJECTION, SOLUTION INTRAVENOUS at 08:02

## 2018-02-28 NOTE — ANESTHESIA POSTPROCEDURE EVALUATION
"Anesthesia Post Evaluation    Patient: Raul Lujan    Procedure(s) Performed: Procedure(s) (LRB):  ESOPHAGOGASTRODUODENOSCOPY (EGD) (N/A)    Final Anesthesia Type: general  Patient location during evaluation: PACU  Patient participation: Yes- Able to Participate  Level of consciousness: awake and alert and oriented  Post-procedure vital signs: reviewed and stable  Pain management: adequate  Airway patency: patent  PONV status at discharge: No PONV  Anesthetic complications: no      Cardiovascular status: blood pressure returned to baseline and hemodynamically stable  Respiratory status: unassisted, spontaneous ventilation and room air  Hydration status: euvolemic  Follow-up not needed.        Visit Vitals  /77 (BP Location: Left arm, Patient Position: Lying)   Pulse (!) 51   Temp 36.7 °C (98.1 °F) (Temporal)   Resp 14   Ht 5' 9" (1.753 m)   Wt 73.5 kg (162 lb)   SpO2 97%   BMI 23.92 kg/m²       Pain/Krystyna Score: Pain Assessment Performed: Yes (2/28/2018  9:05 AM)  Presence of Pain: denies (2/28/2018  9:05 AM)  Krystyna Score: 10 (2/28/2018  9:05 AM)      "

## 2018-02-28 NOTE — INTERVAL H&P NOTE
The patient has been examined and the H&P has been reviewed:    I concur with the findings and no changes have occurred since H&P was written.     History and Exam unchanged from visit.    Procedure - EGD  Neck - supple  Plan of anesthesia - General  ASA - per anesthesia  Mallampati - per anesthesia  Anesthesia problems - no  Family history of anesthesia problems - no      Anesthesia/Surgery risks, benefits and alternative options discussed and understood by patient/family.          Active Hospital Problems    Diagnosis  POA    GERD (gastroesophageal reflux disease) [K21.9]  Yes      Resolved Hospital Problems    Diagnosis Date Resolved POA   No resolved problems to display.

## 2018-02-28 NOTE — H&P (VIEW-ONLY)
Subjective:       Patient ID: Raul Lujan is a 72 y.o. male.    Chief Complaint: Annual Exam    Here for Annual Exam. Has EGD and Eye exam in next 2 days. Continues to have chronic left abdominal pain. He needed a few Rx refills. Denies CP or SOB. Needs update Pneumovax.   He may be moving permanently to [place].       Review of Systems   Constitutional: Negative for activity change, chills, fatigue, fever and unexpected weight change.   HENT: Negative for hearing loss, nosebleeds, rhinorrhea and trouble swallowing.    Eyes: Negative for pain, discharge and visual disturbance.   Respiratory: Negative for cough, chest tightness, shortness of breath and wheezing.    Cardiovascular: Negative for chest pain and palpitations.   Gastrointestinal: Positive for abdominal pain (LLQ). Negative for blood in stool, constipation, diarrhea, nausea and vomiting.   Endocrine: Negative for polydipsia and polyuria.   Genitourinary: Negative for difficulty urinating, hematuria and urgency.   Musculoskeletal: Positive for arthralgias, back pain and neck pain. Negative for joint swelling.   Skin: Negative for rash.   Neurological: Negative for dizziness, weakness and headaches.   Hematological: Does not bruise/bleed easily.   Psychiatric/Behavioral: Negative for confusion, dysphoric mood, sleep disturbance and suicidal ideas.       Objective:      Physical Exam   Constitutional: He is oriented to person, place, and time. He appears well-developed and well-nourished. No distress.   HENT:   Head: Normocephalic and atraumatic.   Right Ear: External ear normal.   Left Ear: External ear normal.   Mouth/Throat: Oropharynx is clear and moist. No oropharyngeal exudate.   TM's clear, pharynx clear   Eyes: Conjunctivae and EOM are normal. Pupils are equal, round, and reactive to light. No scleral icterus.   Neck: Normal range of motion. Neck supple. No thyromegaly present.   No supraclavicular nodes palpated   Cardiovascular: Normal rate,  regular rhythm and normal heart sounds.    No murmur heard.  Pulmonary/Chest: Effort normal and breath sounds normal. He has no wheezes.   Abdominal: Soft. Bowel sounds are normal. He exhibits no mass. There is no tenderness.   Musculoskeletal: He exhibits no edema.   Lymphadenopathy:     He has no cervical adenopathy.   Neurological: He is alert and oriented to person, place, and time.   Skin: No pallor.   Psychiatric: He has a normal mood and affect.       Assessment:       1. Routine physical examination    2. Degeneration of lumbar or lumbosacral intervertebral disc    3. Chronic asthma, mild persistent, uncomplicated    4. Essential hypertension    5. Hyperlipidemia, unspecified hyperlipidemia type    6. Neck stiffness    7. Spondylosis without myelopathy    8. Gastroesophageal reflux disease, esophagitis presence not specified    9. BPH with urinary obstruction    10. Need for prophylactic vaccination with Streptococcus pneumoniae (Pneumococcus) and Influenza vaccines        Plan:       Raul was seen today for annual exam.    Diagnoses and all orders for this visit:    Routine physical examination    Degeneration of lumbar or lumbosacral intervertebral disc    Chronic asthma, mild persistent, uncomplicated    Essential hypertension  -     Lipid panel; Future    Hyperlipidemia, unspecified hyperlipidemia type  -     Lipid panel; Future    Neck stiffness    Spondylosis without myelopathy    Gastroesophageal reflux disease, esophagitis presence not specified  Comments:  Having an EGD tomorrow.     BPH with urinary obstruction  -     finasteride (PROSCAR) 5 mg tablet; TAKE 1 TABLET ONE TIME DAILY    Need for prophylactic vaccination with Streptococcus pneumoniae (Pneumococcus) and Influenza vaccines  -     Pneumococcal Polysaccharide Vaccine (23 Valent) (SQ/IM)    Other orders  -     lisinopril-hydrochlorothiazide (PRINZIDE,ZESTORETIC) 10-12.5 mg per tablet; Take 1 tablet by mouth 2 (two) times daily.  -      pravastatin (PRAVACHOL) 40 MG tablet; Take 1 tablet (40 mg total) by mouth once daily. 1 Tablet Oral Every evening  -     fluticasone (FLOVENT HFA) 110 mcg/actuation inhaler; Inhale 2 puffs into the lungs 2 (two) times daily. Rinse mouth after each use.        Review labs.

## 2018-02-28 NOTE — ANESTHESIA PREPROCEDURE EVALUATION
02/28/2018  Raul Lujan is a 72 y.o., male.    Anesthesia Evaluation         Review of Systems      Physical Exam  General:  Well nourished    Airway/Jaw/Neck:  Airway Findings: Mouth Opening: Normal Tongue: Normal  General Airway Assessment: Adult  Mallampati: II  Improves to I with phonation.  TM Distance: Normal, at least 6 cm      Dental:  Dental Findings: In tact   Chest/Lungs:  Chest/Lungs Findings: Clear to auscultation, Normal Respiratory Rate     Heart/Vascular:  Heart Findings: Rate: Normal  Rhythm: Regular Rhythm        Mental Status:  Mental Status Findings:  Cooperative, Alert and Oriented         Anesthesia Plan  Type of Anesthesia, risks & benefits discussed:  Anesthesia Type:  general, MAC  Patient's Preference: either  Intra-op Monitoring Plan:   Intra-op Monitoring Plan Comments:   Post Op Pain Control Plan: multimodal analgesia  Post Op Pain Control Plan Comments:   Induction:   IV  Beta Blocker:  Patient is not currently on a Beta-Blocker (No further documentation required).       Informed Consent: Patient understands risks and agrees with Anesthesia plan.  Questions answered. Anesthesia consent signed with patient.  ASA Score: 2     Day of Surgery Review of History & Physical:    H&P update referred to the surgeon.         Ready For Surgery From Anesthesia Perspective.

## 2018-02-28 NOTE — PROVATION PATIENT INSTRUCTIONS
Discharge Summary/Instructions after an Endoscopic Procedure  Patient Name: Raul Lujan  Patient MRN: [id number]  Patient YOB: 1945 Wednesday, February 28, 2018  Tony Day MD  RESTRICTIONS:  During your procedure today, you received medications for sedation.  These   medications may affect your judgment, balance and coordination.  Therefore,   for 24 hours, you have the following restrictions:   - DO NOT drive a car, operate machinery, make legal/financial decisions,   sign important papers or drink alcohol.    ACTIVITY:  The following day: return to full activity including work, except no heavy   lifting, straining or running for 3 days if polyps were removed.  DIET:  Eat and drink normally unless instructed otherwise.     TREATMENT FOR COMMON SIDE EFFECTS:  - Mild abdominal pain, nausea, belching, bloating or excessive gas:  rest,   eat lightly and use a heating pad.  - Sore Throat: treat with throat lozenges and/or gargle with warm salt   water.  - Because air was used during the procedure, expelling large amounts of air   from your rectum or belching is normal.  - If a bowel prep was taken, you may not have a bowel movement for 1-3 days.    This is normal.  SYMPTOMS TO WATCH FOR AND REPORT TO YOUR PHYSICIAN:  1. Abdominal pain or bloating, other than gas cramps.  2. Chest pain.  3. Back pain.  4. Signs of infection such as: chills or fever occurring within 24 hours   after the procedure.  5. Rectal bleeding, which would show as bright red, maroon, or black stools.   (A tablespoon of blood from the rectum is not serious, especially if   hemorrhoids are present.)  6. Vomiting.  7. Weakness or dizziness.  GO DIRECTLY TO THE NEAREST EMERGENCY ROOM IF YOU HAVE ANY OF THE FOLLOWING:      Difficulty breathing  Chills and/or fever over 101 F   Persistent vomiting and/or vomiting blood   Severe abdominal pain   Severe chest pain   Black, tarry stools   Bleeding- more than one tablespoon   Any other symptom  or condition that you feel may need urgent attention  Your doctor recommends these additional instructions:  If any biopsies were taken, your doctors clinic will contact you in 1 to 2   weeks with any results.  You have a contact number available for emergencies.  The signs and symptoms   of potential delayed complications were discussed with you.  You may return   to normal activities tomorrow.  Written discharge instructions were   provided to you.   You are being discharged to home.   We are waiting for your pathology results.   The findings and recommendations were discussed with your designated   responsible adult.  For questions, problems or results please call your physician - Tony Day MD at Work:  [phone].  [facility], EMERGENCY ROOM PHONE NUMBER: [phone]  IF A COMPLICATION OR EMERGENCY SITUATION ARISES AND YOU ARE UNABLE TO REACH   YOUR PHYSICIAN - GO DIRECTLY TO THE EMERGENCY ROOM.  Tony Day MD  2/28/2018 8:26:51 AM  This report has been verified and signed electronically.

## 2018-02-28 NOTE — DISCHARGE INSTRUCTIONS

## 2018-02-28 NOTE — TRANSFER OF CARE
"Anesthesia Transfer of Care Note    Patient: Raul Lujan    Procedure(s) Performed: Procedure(s) (LRB):  ESOPHAGOGASTRODUODENOSCOPY (EGD) (N/A)    Patient location: GI    Anesthesia Type: general    Transport from OR: Transported from OR on room air with adequate spontaneous ventilation    Post pain: adequate analgesia    Post assessment: no apparent anesthetic complications and tolerated procedure well    Post vital signs: stable    Level of consciousness: awake, alert and oriented    Nausea/Vomiting: no nausea/vomiting    Complications: none    Transfer of care protocol was followed      Last vitals:   Visit Vitals  /72 (BP Location: Left arm, Patient Position: Lying)   Pulse 65   Temp 36.7 °C (98.1 °F) (Temporal)   Resp 16   Ht 5' 9" (1.753 m)   Wt 73.5 kg (162 lb)   SpO2 97%   BMI 23.92 kg/m²     "

## 2018-03-07 ENCOUNTER — TELEPHONE (OUTPATIENT)
Dept: ENDOSCOPY | Facility: HOSPITAL | Age: 73
End: 2018-03-07

## 2018-03-09 ENCOUNTER — TELEPHONE (OUTPATIENT)
Dept: GASTROENTEROLOGY | Facility: CLINIC | Age: 73
End: 2018-03-09

## 2018-03-09 NOTE — TELEPHONE ENCOUNTER
----- Message from Tony Day MD sent at 3/8/2018  2:06 PM CST -----  Please let him know the biopsies from his esophagus and small intestine all did come back normal. I think he saw Michelle in the past but he can have a routine clinic followup with me if he would like

## 2018-03-09 NOTE — TELEPHONE ENCOUNTER
----- Message from Carlyn Ortega sent at 3/9/2018  4:37 PM CST -----  Contact: Self- [phone]  Viridiana pt is returning a missed call to Corry- please call pt back at [phone]

## 2018-06-25 ENCOUNTER — TELEPHONE (OUTPATIENT)
Dept: INTERNAL MEDICINE | Facility: CLINIC | Age: 73
End: 2018-06-25

## 2018-06-25 ENCOUNTER — PATIENT MESSAGE (OUTPATIENT)
Dept: INTERNAL MEDICINE | Facility: CLINIC | Age: 73
End: 2018-06-25

## 2018-06-25 DIAGNOSIS — N13.8 BENIGN PROSTATIC HYPERPLASIA WITH URINARY OBSTRUCTION: ICD-10-CM

## 2018-06-25 DIAGNOSIS — N40.1 BENIGN PROSTATIC HYPERPLASIA WITH URINARY OBSTRUCTION: ICD-10-CM

## 2018-06-25 NOTE — TELEPHONE ENCOUNTER
----- Message from Pricila Phelps sent at 6/25/2018  2:59 PM CDT -----  Contact: [employer]   Prior Authorization Needed    Medication: sildenafil (REVATIO) 20 mg Tab    Pharmacy Info: [facility] - [facility] [address]    Plan does not cover this medication. Please call plan to initiate prior authorization or call/fax pharmacy to change medication. Patient ID#[id number]    Note chart when prior authorization has been submitted.    Please notify pharmacy when prior authorization has been approved.    Thank You

## 2018-06-29 ENCOUNTER — TELEPHONE (OUTPATIENT)
Dept: INTERNAL MEDICINE | Facility: CLINIC | Age: 73
End: 2018-06-29

## 2018-06-29 DIAGNOSIS — N40.1 BENIGN PROSTATIC HYPERPLASIA WITH URINARY OBSTRUCTION: ICD-10-CM

## 2018-06-29 DIAGNOSIS — N13.8 BENIGN PROSTATIC HYPERPLASIA WITH URINARY OBSTRUCTION: ICD-10-CM

## 2018-06-29 RX ORDER — SILDENAFIL CITRATE 20 MG/1
60 TABLET ORAL 3 TIMES DAILY
Qty: 90 TABLET | Refills: 4 | Status: SHIPPED | OUTPATIENT
Start: 2018-06-29 | End: 2018-06-29 | Stop reason: SDUPTHER

## 2018-06-29 RX ORDER — SILDENAFIL CITRATE 20 MG/1
60 TABLET ORAL DAILY
Qty: 90 TABLET | Refills: 4 | Status: SHIPPED | OUTPATIENT
Start: 2018-06-29 | End: 2018-07-02 | Stop reason: SDUPTHER

## 2018-06-29 NOTE — TELEPHONE ENCOUNTER
Yes he can because the max dose for the day for that med it 100mg and 3 daily is only 60mg.   If I have to say take 3 together to get them to fill it I will but they are not correct.

## 2018-06-29 NOTE — TELEPHONE ENCOUNTER
Pharmacy called stating pt cannot take erectile  Medications three times day. Please change directions

## 2018-07-02 ENCOUNTER — PATIENT MESSAGE (OUTPATIENT)
Dept: INTERNAL MEDICINE | Facility: CLINIC | Age: 73
End: 2018-07-02

## 2018-07-02 ENCOUNTER — TELEPHONE (OUTPATIENT)
Dept: INTERNAL MEDICINE | Facility: CLINIC | Age: 73
End: 2018-07-02

## 2018-07-02 DIAGNOSIS — N13.8 BENIGN PROSTATIC HYPERPLASIA WITH URINARY OBSTRUCTION: ICD-10-CM

## 2018-07-02 DIAGNOSIS — N40.1 BENIGN PROSTATIC HYPERPLASIA WITH URINARY OBSTRUCTION: ICD-10-CM

## 2018-07-02 RX ORDER — SILDENAFIL CITRATE 20 MG/1
60 TABLET ORAL DAILY
Qty: 90 TABLET | Refills: 0 | Status: SHIPPED | OUTPATIENT
Start: 2018-07-02 | End: 2019-07-02

## 2018-07-02 NOTE — TELEPHONE ENCOUNTER
Spoke to the pharmacist at [facility]. They were seeking l the rxclarity on how the pt should take medication. They will fill the rx

## 2018-07-02 NOTE — TELEPHONE ENCOUNTER
Jose would like a new script of sildenafil. They will not fill the rx because it says takes three times daily

## 2020-09-11 NOTE — PROGRESS NOTES
HPI     Patient's age: 72 y.o.    Approximate date of last eye examination:  10/21/15  Name of last eye doctor seen: Dr. Hanna    Pt states that he is here for exam of eyes. Get dry eyes need to get   medication for it, use systane drop which helps temporarily.    Wears glasses? Readers     Wears CLs?:  no           Headaches?  no  Eye pain/discomfort?  no                                                                                     Flashes?  no  Floaters?  no  Diplopia/Double vision?  no    Patient's Ocular History:         Any eye surgeries? no         Any eye injury?  no         Any treatment for eye disease?  no  Family history of eye disease?  Glaucoma - runs in family    Significant patient medical history:         1. Diabetes?  no       If yes, IDDM or NIDDM? no   2. HBP?  yes                 ! OTC eyedrops currently using:  Systane - OU PRN   ! Prescription eye meds currently using:  none        Last edited by Yamilka Treadwell MA on 2/27/2018  1:21 PM. (History)            Assessment /Plan     For exam results, see Encounter Report.    Choroidal nevus, left eye  -     Color Fundus Photography - OU - Both Eyes: inferior nasal pigment   Monitor yearly with DFE / photos    NS (nuclear sclerosis), bilateral   MIld, monitor    Dry eye syndrome, bilateral   Continue with systane Qam, increase to BID if dryness worsend   Pt will call back if decides to start Restasis    Essential hypertension   No retinopathy, monitor    RTC 1 year, sooner PRN                    Yes